# Patient Record
Sex: FEMALE | Race: WHITE | NOT HISPANIC OR LATINO | Employment: OTHER | URBAN - METROPOLITAN AREA
[De-identification: names, ages, dates, MRNs, and addresses within clinical notes are randomized per-mention and may not be internally consistent; named-entity substitution may affect disease eponyms.]

---

## 2017-07-06 ENCOUNTER — APPOINTMENT (OUTPATIENT)
Dept: WOMENS IMAGING | Facility: HOSPITAL | Age: 62
End: 2017-07-06

## 2017-07-06 PROCEDURE — 77067 SCR MAMMO BI INCL CAD: CPT | Performed by: RADIOLOGY

## 2017-07-06 PROCEDURE — 77063 BREAST TOMOSYNTHESIS BI: CPT | Performed by: RADIOLOGY

## 2017-10-27 ENCOUNTER — OFFICE VISIT (OUTPATIENT)
Dept: CARDIOLOGY | Facility: CLINIC | Age: 62
End: 2017-10-27

## 2017-10-27 VITALS
WEIGHT: 176 LBS | DIASTOLIC BLOOD PRESSURE: 86 MMHG | HEART RATE: 67 BPM | BODY MASS INDEX: 29.32 KG/M2 | HEIGHT: 65 IN | SYSTOLIC BLOOD PRESSURE: 122 MMHG

## 2017-10-27 DIAGNOSIS — E78.2 MIXED HYPERLIPIDEMIA: ICD-10-CM

## 2017-10-27 DIAGNOSIS — I47.1 SUPRAVENTRICULAR TACHYCARDIA (HCC): Primary | ICD-10-CM

## 2017-10-27 DIAGNOSIS — R00.2 PALPITATIONS: ICD-10-CM

## 2017-10-27 PROCEDURE — 93000 ELECTROCARDIOGRAM COMPLETE: CPT | Performed by: INTERNAL MEDICINE

## 2017-10-27 PROCEDURE — 99204 OFFICE O/P NEW MOD 45 MIN: CPT | Performed by: INTERNAL MEDICINE

## 2017-10-27 RX ORDER — CELECOXIB 200 MG/1
200 CAPSULE ORAL DAILY
COMMUNITY
Start: 2017-09-15 | End: 2021-10-04

## 2017-10-27 RX ORDER — CHOLECALCIFEROL (VITAMIN D3) 50 MCG
2000 TABLET ORAL DAILY
COMMUNITY

## 2017-10-27 RX ORDER — OMEPRAZOLE 40 MG/1
40 CAPSULE, DELAYED RELEASE ORAL DAILY
COMMUNITY
End: 2021-10-04 | Stop reason: SDUPTHER

## 2017-10-27 NOTE — PROGRESS NOTES
Date of Office Visit: 10/27/17  Encounter Provider: Jean Zazueta MD  Place of Service: [facility] CARDIOLOGY  Patient Name: Sweta Ceja  :1955      Chief Complaint   Patient presents with   • Irregular Heart Beat     History of Present Illness  HPI Comments: Mrs Ceja is a pleasant 61 yo female who has a history of hyperlipidemia, AV reentrant tachycardia, AV nelly modification, chest pain with normal cardiac catheterization the past.  She's had previous cardiac evaluation in the past with normal cardiac catheterization in , normal perfusion stress test in  normal echocardiogram in the past she was having episodes of palpitations rapid heart beating and in 2001 had EP study with ablation of slow AV nelly pathway.  She had been stable until about the last month she still these increased episodes of palpitations probably averaging maybe once a week he typically would last one to 2 minutes that showed an episode just the other day that was more severe and lasted about 10 minutes and on her that.  Her heart rate was 154 bpm.  She does get associated dizziness with that no real chest pain or pressure no near syncope or syncope.  She also had an episode about a week ago where she would get this abrupt onset of some dizziness and diaphoresis and that would come and go for a total of about 5 times.  He typically walks up to 3-5 miles a day without problems no chest pain or pressure.  She denies any recent change in medication.  Over the than this she overall feels like she's been doing well things at home are good.        Past Medical History:   Diagnosis Date   • Chest pain    • Chronic lung disease    • COPD (chronic obstructive pulmonary disease)    • GERD (gastroesophageal reflux disease)    • Hyperlipidemia    • Postmenopausal    • SVT (supraventricular tachycardia)          Past Surgical History:   Procedure Laterality Date   • ABLATION OF DYSRHYTHMIC FOCUS     •  CARDIAC CATHETERIZATION     •  SECTION     • CHOLECYSTECTOMY     • HYSTERECTOMY     • KNEE ARTHROSCOPY Bilateral     meniscus repair         Current Outpatient Prescriptions on File Prior to Visit   Medication Sig Dispense Refill   • calcium carbonate (OS-JOSIE) 600 MG tablet Take 600 mg by mouth Daily.     • [DISCONTINUED] aspirin 81 MG EC tablet Take 81 mg by mouth Daily.     • [DISCONTINUED] estrogens, conjugated, (PREMARIN) 0.3 MG tablet Take 0.3 mg by mouth Daily. Take daily for 21 days then do not take for 7 days.     • [DISCONTINUED] Omega-3 1000 MG capsule Take  by mouth.     • [DISCONTINUED] pantoprazole (PROTONIX) 40 MG EC tablet Take 40 mg by mouth Daily.     • [DISCONTINUED] simvastatin (ZOCOR) 20 MG tablet Take 20 mg by mouth Every Night.       No current facility-administered medications on file prior to visit.          Social History     Social History   • Marital status:      Spouse name: N/A   • Number of children: N/A   • Years of education: N/A     Occupational History   • Not on file.     Social History Main Topics   • Smoking status: Former Smoker     Packs/day: 0.75     Years: 15.00     Quit date: 10/27/1987   • Smokeless tobacco: Not on file   • Alcohol use Yes      Comment: socially,rare   • Drug use: Not on file   • Sexual activity: Not on file     Other Topics Concern   • Not on file     Social History Narrative       Family History   Problem Relation Age of Onset   • Atrial fibrillation Mother    • Cerebral aneurysm Mother    • Transient ischemic attack Mother    • Hypertension Mother    • Breast cancer Mother    • Diabetes type I Father          Review of Systems   Constitution: Negative for decreased appetite, diaphoresis, fever, weakness, malaise/fatigue, weight gain and weight loss.   HENT: Negative for congestion, hearing loss, nosebleeds and tinnitus.    Eyes: Negative for blurred vision, double vision, vision loss in left eye, vision loss in right eye and visual  "disturbance.   Cardiovascular:        As noted in HPI   Respiratory:        As noted HPI   Endocrine: Negative for cold intolerance and heat intolerance.   Hematologic/Lymphatic: Negative for bleeding problem. Does not bruise/bleed easily.   Skin: Negative for color change, flushing, itching and rash.   Musculoskeletal: Negative for arthritis, back pain, joint pain, joint swelling, muscle weakness and myalgias.   Gastrointestinal: Negative for bloating, abdominal pain, constipation, diarrhea, dysphagia, heartburn, hematemesis, hematochezia, melena, nausea and vomiting.   Genitourinary: Negative for bladder incontinence, dysuria, frequency, nocturia and urgency.   Neurological: Negative for dizziness, focal weakness, headaches, light-headedness, loss of balance, numbness, paresthesias and vertigo.   Psychiatric/Behavioral: Negative for depression, memory loss and substance abuse.       Procedures      ECG 12 Lead  Date/Time: 10/27/2017 11:58 AM  Performed by: ABHILASH PEDRAZA  Authorized by: ABHILASH PEDRAZA   Comparison: compared with previous ECG   Similar to previous ECG  Rhythm: sinus rhythm  Rate: normal  QRS axis: normal                 Objective:    /86 (BP Location: Left arm)  Pulse 67  Ht 65\" (165.1 cm)  Wt 176 lb (79.8 kg)  BMI 29.29 kg/m2       Physical Exam  Physical Exam   Constitutional: She is oriented to person, place, and time. She appears well-developed and well-nourished. No distress.   HENT:   Head: Normocephalic.   Eyes: Conjunctivae are normal. Pupils are equal, round, and reactive to light. No scleral icterus.   Neck: Normal carotid pulses, no hepatojugular reflux and no JVD present. Carotid bruit is not present. No tracheal deviation, no edema and no erythema present. No thyromegaly present.   Cardiovascular: Normal rate, regular rhythm, S1 normal, S2 normal, normal heart sounds and intact distal pulses.   No extrasystoles are present. PMI is not displaced.  Exam reveals no gallop, " no distant heart sounds and no friction rub.    No murmur heard.  Pulses:       Carotid pulses are 2+ on the right side, and 2+ on the left side.       Radial pulses are 2+ on the right side, and 2+ on the left side.        Femoral pulses are 2+ on the right side, and 2+ on the left side.       Dorsalis pedis pulses are 2+ on the right side, and 2+ on the left side.        Posterior tibial pulses are 2+ on the right side, and 2+ on the left side.   Pulmonary/Chest: Effort normal and breath sounds normal. No respiratory distress. She has no decreased breath sounds. She has no wheezes. She has no rhonchi. She has no rales. She exhibits no tenderness.   Abdominal: Soft. Bowel sounds are normal. She exhibits no distension and no mass. There is no hepatosplenomegaly. There is no tenderness. There is no rebound and no guarding.   Musculoskeletal: She exhibits no edema, tenderness or deformity.   Neurological: She is alert and oriented to person, place, and time.   Skin: Skin is warm and dry. No rash noted. She is not diaphoretic. No cyanosis or erythema. No pallor. Nails show no clubbing.   Psychiatric: She has a normal mood and affect. Her speech is normal and behavior is normal. Judgment and thought content normal.           Assessment:   1.  62-year-old female with history of AV node reentrant tachycardia status post AV nelly modification in November 2011 previous normal cardiac evaluation with normal cardiac catheterization and normal echocardiogram.  Now with what appears to be recurrent episodes of palpitations that could represent the same arrhythmia or different arrhythmia.  She has lab work pending from her PCP we'll try to get copies of that.  In addition we'll have her wear a 30 day event monitor she does get episodes daily and make further recommendations pending the result of that.  2.  History of AV nelly reentry tachycardia status post ablation as outlined above.  She is to go back on 81 mg coated aspirin  daily.  3.  History of hyperlipidemia on target dose simvastatin continue the same.  4.  Negative cardiovascular evaluation in the past with normal cardiac catheterization an echocardiogram in 2001.  Normal perfusion stress test 2008.          Plan:

## 2017-11-02 ENCOUNTER — TELEPHONE (OUTPATIENT)
Dept: CARDIOLOGY | Facility: CLINIC | Age: 62
End: 2017-11-02

## 2017-11-07 RX ORDER — DILTIAZEM HYDROCHLORIDE 180 MG/1
180 CAPSULE, EXTENDED RELEASE ORAL DAILY
Qty: 30 CAPSULE | Refills: 11 | Status: SHIPPED | OUTPATIENT
Start: 2017-11-07 | End: 2021-10-04

## 2017-11-07 NOTE — TELEPHONE ENCOUNTER
"Pt called back regarding the Metoprolol you started her on last week.  She states this medication makes her feel fatigued, depressed and not wanting to leave the house.  She also c/o being \"castañeda\"    Please advise/f    # [phone]  "

## 2017-11-28 ENCOUNTER — TELEPHONE (OUTPATIENT)
Dept: CARDIOLOGY | Facility: CLINIC | Age: 62
End: 2017-11-28

## 2017-11-28 LAB
Lab: 6
TOAL ENROLLMENT DAYS: 30

## 2017-11-28 NOTE — TELEPHONE ENCOUNTER
Call, Sweta  Female, 62 y.o., 1955  PCP:   Georges Brooke MD  Language:   English  Need Interp:   No  Last Weight:   176 lb (79.8 kg)  Phone:   M: [phone] H: [phone]  Allergies  Lodine [Etodolac]  Health Maintenance:   Due  FYI:   None  Primary Ins.:   DEE GAVIN  MRN:   [id number]  MyChart:   Pending  Pharmacy:   [facility] [phone] [facility] [phone] FX [[id number]]  Preferred Lab:   None  Next Appt with Me:   None  Next Appt Date by Dept:   None    Cardiac Event Monitor   Status:  Final result   Visible to patient:  No (Not Released) Dx:  Supraventricular tachycardia; Palpita... Order: [id number]         Details        Reading Physician Reading Date Result Priority       Jean Zazueta MD 11/28/2017 Routine           Result Text             · The patient wore a 30 day event monitor. A total of 6patient triggered events were sent.  · These episodes correlated to premature atrial beats including nonsustained atrial tachycardia the longest being 8 beats.                    All Measurements          8:11 AM         Total Enrollment Days 30       Number of Transmissions 6                       [facility] CARDIOLOGY  [address] [place] [address]                       Last Resulted: 11/28/17  8:51 AM                            Status of Other Orders        Order    Lab Status Result Date Provider Status       ECG 12 Lead Final result 10/27/2017 Open       SCANNED EKG Final result 10/27/2017 Open                  Routing History        Priority Sent On From To Message Type        11/28/2017  8:52 AM MD Jean Deleon MD Results

## 2018-07-19 ENCOUNTER — APPOINTMENT (OUTPATIENT)
Dept: WOMENS IMAGING | Facility: HOSPITAL | Age: 63
End: 2018-07-19

## 2018-07-19 PROCEDURE — 77067 SCR MAMMO BI INCL CAD: CPT | Performed by: RADIOLOGY

## 2018-07-19 PROCEDURE — 77063 BREAST TOMOSYNTHESIS BI: CPT | Performed by: RADIOLOGY

## 2021-03-19 ENCOUNTER — BULK ORDERING (OUTPATIENT)
Dept: CASE MANAGEMENT | Facility: OTHER | Age: 66
End: 2021-03-19

## 2021-03-19 DIAGNOSIS — Z23 IMMUNIZATION DUE: ICD-10-CM

## 2021-08-20 ENCOUNTER — APPOINTMENT (OUTPATIENT)
Dept: WOMENS IMAGING | Facility: HOSPITAL | Age: 66
End: 2021-08-20

## 2021-08-20 PROCEDURE — 77067 SCR MAMMO BI INCL CAD: CPT | Performed by: RADIOLOGY

## 2021-08-20 PROCEDURE — 77063 BREAST TOMOSYNTHESIS BI: CPT | Performed by: RADIOLOGY

## 2021-10-04 ENCOUNTER — OFFICE VISIT (OUTPATIENT)
Dept: FAMILY MEDICINE CLINIC | Facility: CLINIC | Age: 66
End: 2021-10-04

## 2021-10-04 VITALS
TEMPERATURE: 98.2 F | WEIGHT: 187 LBS | HEART RATE: 69 BPM | BODY MASS INDEX: 31.16 KG/M2 | HEIGHT: 65 IN | DIASTOLIC BLOOD PRESSURE: 72 MMHG | SYSTOLIC BLOOD PRESSURE: 140 MMHG | OXYGEN SATURATION: 99 %

## 2021-10-04 DIAGNOSIS — Z79.899 HIGH RISK MEDICATION USE: ICD-10-CM

## 2021-10-04 DIAGNOSIS — Z23 ENCOUNTER FOR IMMUNIZATION: ICD-10-CM

## 2021-10-04 DIAGNOSIS — E78.2 MIXED HYPERLIPIDEMIA: ICD-10-CM

## 2021-10-04 DIAGNOSIS — Z00.00 MEDICARE ANNUAL WELLNESS VISIT, SUBSEQUENT: Primary | ICD-10-CM

## 2021-10-04 DIAGNOSIS — K21.9 GASTRO-ESOPHAGEAL REFLUX DISEASE WITHOUT ESOPHAGITIS: ICD-10-CM

## 2021-10-04 DIAGNOSIS — R53.82 CHRONIC FATIGUE: ICD-10-CM

## 2021-10-04 DIAGNOSIS — E55.9 VITAMIN D DEFICIENCY: ICD-10-CM

## 2021-10-04 PROBLEM — G47.00 INSOMNIA: Status: ACTIVE | Noted: 2019-02-14

## 2021-10-04 PROCEDURE — 90662 IIV NO PRSV INCREASED AG IM: CPT | Performed by: FAMILY MEDICINE

## 2021-10-04 PROCEDURE — 99204 OFFICE O/P NEW MOD 45 MIN: CPT | Performed by: FAMILY MEDICINE

## 2021-10-04 PROCEDURE — 1170F FXNL STATUS ASSESSED: CPT | Performed by: FAMILY MEDICINE

## 2021-10-04 PROCEDURE — G0008 ADMIN INFLUENZA VIRUS VAC: HCPCS | Performed by: FAMILY MEDICINE

## 2021-10-04 PROCEDURE — G0439 PPPS, SUBSEQ VISIT: HCPCS | Performed by: FAMILY MEDICINE

## 2021-10-04 PROCEDURE — 1159F MED LIST DOCD IN RCRD: CPT | Performed by: FAMILY MEDICINE

## 2021-10-04 RX ORDER — MULTIPLE VITAMINS W/ MINERALS TAB 9MG-400MCG
1 TAB ORAL DAILY
COMMUNITY
End: 2022-03-14

## 2021-10-04 RX ORDER — OMEPRAZOLE 40 MG/1
40 CAPSULE, DELAYED RELEASE ORAL DAILY
Qty: 90 CAPSULE | Refills: 3 | Status: SHIPPED | OUTPATIENT
Start: 2021-10-04 | End: 2022-10-23

## 2021-10-04 RX ORDER — VITAMIN B COMPLEX
1 CAPSULE ORAL DAILY
COMMUNITY

## 2021-10-04 NOTE — PROGRESS NOTES
The ABCs of the Annual Wellness Visit  Subsequent Medicare Wellness Visit    Chief Complaint   Patient presents with   • Medicare Wellness-subsequent      Subjective    History of Present Illness:  Sweta Ceja is a 66 y.o. female who presents for a Subsequent Medicare Wellness Visit.  Here as NP.     F/u Roger.  Doing well with med.s.  C/o fatigue.  Going on months.  No bettter.  Started B complex but no change.    The following portions of the patient's history were reviewed and   updated as appropriate: allergies, current medications, past family history, past medical history, past social history, past surgical history and problem list.    Compared to one year ago, the patient feels her physical   health is the same.    Compared to one year ago, the patient feels her mental   health is the same.    Recent Hospitalizations:  She was not admitted to the hospital during the last year.       Current Medical Providers:  Patient Care Team:  Georges Brooke MD as PCP - General (Family Medicine)    Outpatient Medications Prior to Visit   Medication Sig Dispense Refill   • B Complex Vitamins (vitamin b complex) capsule capsule Take 1 capsule by mouth Daily.     • calcium carbonate (OS-JOSIE) 600 MG tablet Take 600 mg by mouth Daily.     • Cholecalciferol (VITAMIN D) 2000 units tablet Take 2,000 Units by mouth Daily.     • magnesium oxide (MAGOX) 400 (241.3 Mg) MG tablet tablet Take 400 mg by mouth Daily.     • multivitamin with minerals tablet tablet Take 1 tablet by mouth Daily.     • omeprazole (priLOSEC) 40 MG capsule Take 40 mg by mouth Daily.     • celecoxib (CeleBREX) 200 MG capsule Take 200 mg by mouth Daily.     • diltiazem XR (DILACOR XR) 180 MG 24 hr capsule Take 1 capsule by mouth Daily. 30 capsule 11     No facility-administered medications prior to visit.       No opioid medication identified on active medication list. I have reviewed chart for other potential  high risk medication/s and harmful drug  "interactions in the elderly.          Aspirin is not on active medication list.  Aspirin use is not indicated based on review of current medical condition/s. Risk of harm outweighs potential benefits.  .    Patient Active Problem List   Diagnosis   • Gastro-esophageal reflux disease without esophagitis   • Hyperlipidemia   • Insomnia   • Obesity   • Vitamin D deficiency   • Medicare annual wellness visit, subsequent   • Chronic fatigue     Advance Care Planning  Advance Directive is not on file.  ACP discussion was held with the patient during this visit. Patient has an advance directive (not in EMR), copy requested.    Review of Systems   Constitutional: Positive for fatigue. Negative for appetite change.   HENT: Negative for mouth sores and nosebleeds.    Eyes: Negative for photophobia.   Respiratory: Negative for cough and shortness of breath.    Cardiovascular: Negative for chest pain, palpitations and leg swelling.   Gastrointestinal: Negative for abdominal pain and anal bleeding.   Genitourinary: Negative for flank pain and frequency.   Musculoskeletal: Negative for back pain and gait problem.   Skin: Negative for color change and pallor.   Neurological: Negative for facial asymmetry.   Psychiatric/Behavioral: Negative for decreased concentration and dysphoric mood.        Objective    Vitals:    10/04/21 1036   BP: 140/72   BP Location: Right arm   Patient Position: Sitting   Pulse: 69   Temp: 98.2 °F (36.8 °C)   TempSrc: Temporal   SpO2: 99%   Weight: 84.8 kg (187 lb)   Height: 165.1 cm (65\")     BMI Readings from Last 1 Encounters:   10/04/21 31.12 kg/m²   BMI is above normal parameters. Recommendations include: exercise counseling    Does the patient have evidence of cognitive impairment? No    Physical Exam  Vitals reviewed.   Constitutional:       Appearance: She is well-developed. She is not diaphoretic.   HENT:      Head: Normocephalic and atraumatic.   Eyes:      General: No scleral icterus.     " Pupils: Pupils are equal, round, and reactive to light.   Neck:      Thyroid: No thyromegaly.   Cardiovascular:      Rate and Rhythm: Normal rate and regular rhythm.      Heart sounds: No murmur heard.   No friction rub. No gallop.    Pulmonary:      Effort: Pulmonary effort is normal. No respiratory distress.      Breath sounds: No wheezing or rales.   Chest:      Chest wall: No tenderness.   Abdominal:      General: Bowel sounds are normal. There is no distension.      Palpations: Abdomen is soft.      Tenderness: There is no abdominal tenderness.   Musculoskeletal:         General: No deformity. Normal range of motion.   Lymphadenopathy:      Cervical: No cervical adenopathy.   Skin:     General: Skin is warm and dry.      Findings: No rash.   Neurological:      Cranial Nerves: No cranial nerve deficit.      Motor: No abnormal muscle tone.                 HEALTH RISK ASSESSMENT    Smoking Status:  Social History     Tobacco Use   Smoking Status Former Smoker   • Packs/day: 0.75   • Years: 15.00   • Pack years: 11.25   • Quit date:    • Years since quittin.7   Smokeless Tobacco Never Used     Alcohol Consumption:  Social History     Substance and Sexual Activity   Alcohol Use Yes    Comment: socially,rare     Fall Risk Screen:    STEADI Fall Risk Assessment has not been completed.    Depression Screening:  No flowsheet data found.    Health Habits and Functional and Cognitive Screening:  Functional & Cognitive Status 10/4/2021   Do you have difficulty preparing food and eating? No   Do you have difficulty bathing yourself, getting dressed or grooming yourself? No   Do you have difficulty using the toilet? No   Do you have difficulty moving around from place to place? No   Do you have trouble with steps or getting out of a bed or a chair? No   Current Diet Limited Junk Food   Dental Exam Up to date   Eye Exam Up to date   Exercise (times per week) 5 times per week   Current Exercises Include Walking   Do  you need help using the phone?  No   Are you deaf or do you have serious difficulty hearing?  No   Do you need help with transportation? No   Do you need help shopping? No   Do you need help preparing meals?  No   Do you need help with housework?  No   Do you need help with laundry? No   Do you need help taking your medications? No   Do you need help managing money? No   Do you ever drive or ride in a car without wearing a seat belt? No   Have you felt unusual stress, anger or loneliness in the last month? No   Who do you live with? Spouse   If you need help, do you have trouble finding someone available to you? No   Do you have difficulty concentrating, remembering or making decisions? No       Age-appropriate Screening Schedule:  Refer to the list below for future screening recommendations based on patient's age, sex and/or medical conditions. Orders for these recommended tests are listed in the plan section. The patient has been provided with a written plan.    Health Maintenance   Topic Date Due   • DXA SCAN  Never done   • TDAP/TD VACCINES (2 - Tdap) 07/31/2010   • LIPID PANEL  Never done   • ZOSTER VACCINE (2 of 2) 08/23/2018   • INFLUENZA VACCINE  10/01/2021   • MAMMOGRAM  07/06/2023              Assessment/Plan   CMS Preventative Services Quick Reference  Risk Factors Identified During Encounter  Inactivity/Sedentary  The above risks/problems have been discussed with the patient.  Follow up actions/plans if indicated are seen below in the Assessment/Plan Section.  Pertinent information has been shared with the patient in the After Visit Summary.    Diagnoses and all orders for this visit:    1. Medicare annual wellness visit, subsequent (Primary)  -     Fluzone High-Dose 65+yrs    2. Chronic fatigue  -     CBC & Differential  -     Comprehensive Metabolic Panel  -     Vitamin B12  -     TSH Rfx On Abnormal To Free T4    3. Gastro-esophageal reflux disease without esophagitis  -     omeprazole (priLOSEC) 40  MG capsule; Take 1 capsule by mouth Daily.  Dispense: 90 capsule; Refill: 3    4. Mixed hyperlipidemia  -     Lipid Panel With / Chol / HDL Ratio    5. Vitamin D deficiency  -     Vitamin D 1,25 Dihydroxy    6. High risk medication use  -     Lipid Panel With / Chol / HDL Ratio  -     Vitamin B12    7. Encounter for immunization   -     Fluzone High-Dose 65+yrs      Fatigue.  New problem.  Check CBC, CMP, thyroid, b12.    Hyperlipidmeia.  Check FLP.    BRAULIO.  Controlled.  Continue PPI. RF omeprazole 40 today.    Preventive Counseling:  Encouraged to stay active.  Covid vaccine UTD.  Flu today.  Pneumovax UTD.  Colonoscopy held for covid reasons..    Dentist UTD.  Optho UTD.      Follow Up:   Return in about 4 months (around 2/4/2022).     An After Visit Summary and PPPS were made available to the patient.

## 2021-10-05 LAB
1,25(OH)2D SERPL-MCNC: 53.9 PG/ML (ref 19.9–79.3)
ALBUMIN SERPL-MCNC: 4.8 G/DL (ref 3.5–5.2)
ALBUMIN/GLOB SERPL: 2.5 G/DL
ALP SERPL-CCNC: 53 U/L (ref 39–117)
ALT SERPL-CCNC: 18 U/L (ref 1–33)
AST SERPL-CCNC: 18 U/L (ref 1–32)
BASOPHILS # BLD AUTO: 0.06 10*3/MM3 (ref 0–0.2)
BASOPHILS NFR BLD AUTO: 1 % (ref 0–1.5)
BILIRUB SERPL-MCNC: 0.4 MG/DL (ref 0–1.2)
BUN SERPL-MCNC: 14 MG/DL (ref 8–23)
BUN/CREAT SERPL: 18.9 (ref 7–25)
CALCIUM SERPL-MCNC: 9.5 MG/DL (ref 8.6–10.5)
CHLORIDE SERPL-SCNC: 102 MMOL/L (ref 98–107)
CHOLEST SERPL-MCNC: 243 MG/DL (ref 0–200)
CHOLEST/HDLC SERPL: 5.4 {RATIO}
CO2 SERPL-SCNC: 27 MMOL/L (ref 22–29)
CREAT SERPL-MCNC: 0.74 MG/DL (ref 0.57–1)
EOSINOPHIL # BLD AUTO: 0.12 10*3/MM3 (ref 0–0.4)
EOSINOPHIL NFR BLD AUTO: 2 % (ref 0.3–6.2)
ERYTHROCYTE [DISTWIDTH] IN BLOOD BY AUTOMATED COUNT: 13.2 % (ref 12.3–15.4)
GLOBULIN SER CALC-MCNC: 1.9 GM/DL
GLUCOSE SERPL-MCNC: 91 MG/DL (ref 65–99)
HCT VFR BLD AUTO: 41.8 % (ref 34–46.6)
HDLC SERPL-MCNC: 45 MG/DL (ref 40–60)
HGB BLD-MCNC: 13.9 G/DL (ref 12–15.9)
IMM GRANULOCYTES # BLD AUTO: 0.02 10*3/MM3 (ref 0–0.05)
IMM GRANULOCYTES NFR BLD AUTO: 0.3 % (ref 0–0.5)
LDLC SERPL CALC-MCNC: 160 MG/DL (ref 0–100)
LYMPHOCYTES # BLD AUTO: 1.87 10*3/MM3 (ref 0.7–3.1)
LYMPHOCYTES NFR BLD AUTO: 30.8 % (ref 19.6–45.3)
MCH RBC QN AUTO: 29 PG (ref 26.6–33)
MCHC RBC AUTO-ENTMCNC: 33.3 G/DL (ref 31.5–35.7)
MCV RBC AUTO: 87.3 FL (ref 79–97)
MONOCYTES # BLD AUTO: 0.46 10*3/MM3 (ref 0.1–0.9)
MONOCYTES NFR BLD AUTO: 7.6 % (ref 5–12)
NEUTROPHILS # BLD AUTO: 3.54 10*3/MM3 (ref 1.7–7)
NEUTROPHILS NFR BLD AUTO: 58.3 % (ref 42.7–76)
NRBC BLD AUTO-RTO: 0 /100 WBC (ref 0–0.2)
PLATELET # BLD AUTO: 295 10*3/MM3 (ref 140–450)
POTASSIUM SERPL-SCNC: 4.5 MMOL/L (ref 3.5–5.2)
PROT SERPL-MCNC: 6.7 G/DL (ref 6–8.5)
RBC # BLD AUTO: 4.79 10*6/MM3 (ref 3.77–5.28)
SODIUM SERPL-SCNC: 143 MMOL/L (ref 136–145)
TRIGL SERPL-MCNC: 208 MG/DL (ref 0–150)
TSH SERPL DL<=0.005 MIU/L-ACNC: 0.78 UIU/ML (ref 0.27–4.2)
VIT B12 SERPL-MCNC: 1323 PG/ML (ref 211–946)
VLDLC SERPL CALC-MCNC: 38 MG/DL (ref 5–40)
WBC # BLD AUTO: 6.07 10*3/MM3 (ref 3.4–10.8)

## 2022-03-14 ENCOUNTER — OFFICE VISIT (OUTPATIENT)
Dept: FAMILY MEDICINE CLINIC | Facility: CLINIC | Age: 67
End: 2022-03-14

## 2022-03-14 VITALS
TEMPERATURE: 98.7 F | HEART RATE: 77 BPM | WEIGHT: 178 LBS | SYSTOLIC BLOOD PRESSURE: 112 MMHG | HEIGHT: 65 IN | DIASTOLIC BLOOD PRESSURE: 66 MMHG | OXYGEN SATURATION: 98 % | BODY MASS INDEX: 29.66 KG/M2

## 2022-03-14 DIAGNOSIS — T30.0 BURN: ICD-10-CM

## 2022-03-14 DIAGNOSIS — R05.9 COUGH: Primary | ICD-10-CM

## 2022-03-14 PROCEDURE — 99214 OFFICE O/P EST MOD 30 MIN: CPT | Performed by: FAMILY MEDICINE

## 2022-03-14 RX ORDER — CLINDAMYCIN HYDROCHLORIDE 300 MG/1
300 CAPSULE ORAL 3 TIMES DAILY
Qty: 30 CAPSULE | Refills: 0 | Status: SHIPPED | OUTPATIENT
Start: 2022-03-14 | End: 2022-11-23

## 2022-03-14 NOTE — PROGRESS NOTES
Chief Complaint   Patient presents with   • Cough     10 days, causing headache   • Headache       Subjective   Sweta Ceja is a 67 y.o. female.     History of Present Illness   C/o 10 days ago with lower abdominal pain and headache.  Had diarrhea with it.  No fever.   Then developed a cough 9 days ago.  Started a day after had vomiting.  Some mnucus production as well.    C/o burn on L hand.  There for a few days.    The following portions of the patient's history were reviewed and updated as appropriate: allergies, current medications, past family history, past medical history, past social history, past surgical history and problem list.    Review of Systems   Constitutional: Negative for chills, fever and unexpected weight loss.   HENT: Positive for sore throat. Negative for ear pain, postnasal drip and rhinorrhea.    Respiratory: Positive for cough. Negative for shortness of breath and wheezing.    Cardiovascular: Negative for chest pain.   Musculoskeletal: Negative for myalgias.   Skin: Negative for rash.       Patient Active Problem List   Diagnosis   • Gastro-esophageal reflux disease without esophagitis   • Hyperlipidemia   • Insomnia   • Obesity   • Vitamin D deficiency   • Medicare annual wellness visit, subsequent   • Chronic fatigue   • Cough   • Burn       Allergies   Allergen Reactions   • Lodine [Etodolac]          Current Outpatient Medications:   •  B Complex Vitamins (vitamin b complex) capsule capsule, Take 1 capsule by mouth Daily., Disp: , Rfl:   •  calcium carbonate (OS-JOSIE) 600 MG tablet, Take 600 mg by mouth Daily., Disp: , Rfl:   •  Cholecalciferol (VITAMIN D) 2000 units tablet, Take 2,000 Units by mouth Daily., Disp: , Rfl:   •  magnesium oxide (MAGOX) 400 (241.3 Mg) MG tablet tablet, Take 400 mg by mouth Daily., Disp: , Rfl:   •  omeprazole (priLOSEC) 40 MG capsule, Take 1 capsule by mouth Daily., Disp: 90 capsule, Rfl: 3    Past Medical History:   Diagnosis Date   • Chest pain    • Chronic  lung disease    • COPD (chronic obstructive pulmonary disease) (HCC)    • GERD (gastroesophageal reflux disease)    • Hyperlipidemia    • Postmenopausal    • SVT (supraventricular tachycardia) (HCC)        Past Surgical History:   Procedure Laterality Date   • ABLATION OF DYSRHYTHMIC FOCUS     • CARDIAC CATHETERIZATION     •  SECTION     • CHOLECYSTECTOMY     • HYSTERECTOMY     • KNEE ARTHROSCOPY Bilateral     meniscus repair       Family History   Problem Relation Age of Onset   • Atrial fibrillation Mother    • Cerebral aneurysm Mother    • Transient ischemic attack Mother    • Hypertension Mother    • Breast cancer Mother    • Arthritis Mother    • Diabetes type I Father    • Kidney disease Father        Social History     Tobacco Use   • Smoking status: Former Smoker     Packs/day: 0.50     Years: 15.00     Pack years: 7.50     Types: Cigarettes     Start date: 1971     Quit date: 2001     Years since quittin.2   • Smokeless tobacco: Never Used   Substance Use Topics   • Alcohol use: Not Currently     Comment: socially,rare            Objective     Vitals:    22 1511   BP: 112/66   Pulse: 77   Temp: 98.7 °F (37.1 °C)   SpO2: 98%     Body mass index is 29.62 kg/m².    Physical Exam  Vitals reviewed.   Constitutional:       Appearance: She is well-developed. She is not diaphoretic.   HENT:      Head: Normocephalic and atraumatic.   Eyes:      General: No scleral icterus.     Pupils: Pupils are equal, round, and reactive to light.   Neck:      Thyroid: No thyromegaly.   Cardiovascular:      Rate and Rhythm: Normal rate and regular rhythm.      Heart sounds: No murmur heard.    No friction rub. No gallop.   Pulmonary:      Effort: Pulmonary effort is normal. No respiratory distress.      Breath sounds: No wheezing or rales.   Chest:      Chest wall: No tenderness.   Abdominal:      General: Bowel sounds are normal. There is no distension.      Palpations: Abdomen is soft.      Tenderness:  There is no abdominal tenderness.   Musculoskeletal:         General: No deformity. Normal range of motion.   Lymphadenopathy:      Cervical: No cervical adenopathy.   Skin:     General: Skin is warm and dry.      Findings: No rash.      Comments: Lthumb with linear burn 2nd degree.   Neurological:      Cranial Nerves: No cranial nerve deficit.      Motor: No abnormal muscle tone.         Lab Results   Component Value Date    GLUCOSE 91 10/04/2021    BUN 14 10/04/2021    CREATININE 0.74 10/04/2021    EGFRIFNONA 78 10/04/2021    EGFRIFAFRI 95 10/04/2021    BCR 18.9 10/04/2021    K 4.5 10/04/2021    CO2 27.0 10/04/2021    CALCIUM 9.5 10/04/2021    PROTENTOTREF 6.7 10/04/2021    ALBUMIN 4.80 10/04/2021    LABIL2 2.5 10/04/2021    AST 18 10/04/2021    ALT 18 10/04/2021       WBC   Date Value Ref Range Status   10/04/2021 6.07 3.40 - 10.80 10*3/mm3 Final     RBC   Date Value Ref Range Status   10/04/2021 4.79 3.77 - 5.28 10*6/mm3 Final     Hemoglobin   Date Value Ref Range Status   10/04/2021 13.9 12.0 - 15.9 g/dL Final     Hematocrit   Date Value Ref Range Status   10/04/2021 41.8 34.0 - 46.6 % Final     MCV   Date Value Ref Range Status   10/04/2021 87.3 79.0 - 97.0 fL Final     MCH   Date Value Ref Range Status   10/04/2021 29.0 26.6 - 33.0 pg Final     MCHC   Date Value Ref Range Status   10/04/2021 33.3 31.5 - 35.7 g/dL Final     RDW   Date Value Ref Range Status   10/04/2021 13.2 12.3 - 15.4 % Final     Platelets   Date Value Ref Range Status   10/04/2021 295 140 - 450 10*3/mm3 Final     Neutrophil Rel %   Date Value Ref Range Status   10/04/2021 58.3 42.7 - 76.0 % Final     Lymphocyte Rel %   Date Value Ref Range Status   10/04/2021 30.8 19.6 - 45.3 % Final     Monocyte Rel %   Date Value Ref Range Status   10/04/2021 7.6 5.0 - 12.0 % Final     Eosinophil Rel %   Date Value Ref Range Status   10/04/2021 2.0 0.3 - 6.2 % Final     Basophil Rel %   Date Value Ref Range Status   10/04/2021 1.0 0.0 - 1.5 % Final      Neutrophils Absolute   Date Value Ref Range Status   10/04/2021 3.54 1.70 - 7.00 10*3/mm3 Final     Lymphocytes Absolute   Date Value Ref Range Status   10/04/2021 1.87 0.70 - 3.10 10*3/mm3 Final     Monocytes Absolute   Date Value Ref Range Status   10/04/2021 0.46 0.10 - 0.90 10*3/mm3 Final     Eosinophils Absolute   Date Value Ref Range Status   10/04/2021 0.12 0.00 - 0.40 10*3/mm3 Final     Basophils Absolute   Date Value Ref Range Status   10/04/2021 0.06 0.00 - 0.20 10*3/mm3 Final     nRBC   Date Value Ref Range Status   10/04/2021 0.0 0.0 - 0.2 /100 WBC Final       No results found for: HGBA1C    Lab Results   Component Value Date    MTETXAOV05 1,323 (H) 10/04/2021       TSH   Date Value Ref Range Status   10/04/2021 0.776 0.270 - 4.200 uIU/mL Final       No results found for: CHOL  Lab Results   Component Value Date    TRIG 208 (H) 10/04/2021     Lab Results   Component Value Date    HDL 45 10/04/2021     Lab Results   Component Value Date     (H) 10/04/2021     Lab Results   Component Value Date    VLDL 38 10/04/2021     No results found for: LDLHDL      Procedures    Assessment/Plan   Problems Addressed this Visit     Burn    Cough - Primary      Diagnoses       Codes Comments    Cough    -  Primary ICD-10-CM: R05.9  ICD-9-CM: 786.2     Burn     ICD-10-CM: T30.0  ICD-9-CM: 949.0       Cough with systemic symptoms.  Likely aspiration with chemical bronchitis vs anaerobic infection.  Tx with clindamycin.    Burn.  Silvadene rx.      No orders of the defined types were placed in this encounter.      Current Outpatient Medications   Medication Sig Dispense Refill   • B Complex Vitamins (vitamin b complex) capsule capsule Take 1 capsule by mouth Daily.     • calcium carbonate (OS-JOSIE) 600 MG tablet Take 600 mg by mouth Daily.     • Cholecalciferol (VITAMIN D) 2000 units tablet Take 2,000 Units by mouth Daily.     • magnesium oxide (MAGOX) 400 (241.3 Mg) MG tablet tablet Take 400 mg by mouth Daily.      • omeprazole (priLOSEC) 40 MG capsule Take 1 capsule by mouth Daily. 90 capsule 3     No current facility-administered medications for this visit.       Sweta Ceja had no medications administered during this visit.    No follow-ups on file.    There are no Patient Instructions on file for this visit.

## 2022-05-23 ENCOUNTER — OFFICE VISIT (OUTPATIENT)
Dept: FAMILY MEDICINE CLINIC | Facility: CLINIC | Age: 67
End: 2022-05-23

## 2022-05-23 VITALS
DIASTOLIC BLOOD PRESSURE: 62 MMHG | SYSTOLIC BLOOD PRESSURE: 120 MMHG | HEART RATE: 64 BPM | TEMPERATURE: 98 F | BODY MASS INDEX: 29.66 KG/M2 | HEIGHT: 65 IN | WEIGHT: 178 LBS | OXYGEN SATURATION: 99 %

## 2022-05-23 DIAGNOSIS — K21.9 GASTRO-ESOPHAGEAL REFLUX DISEASE WITHOUT ESOPHAGITIS: Primary | ICD-10-CM

## 2022-05-23 DIAGNOSIS — E78.2 MIXED HYPERLIPIDEMIA: ICD-10-CM

## 2022-05-23 DIAGNOSIS — Z28.39 IMMUNIZATION DEFICIENCY: ICD-10-CM

## 2022-05-23 PROCEDURE — 0051A COVID-19 (PFIZER) 12+ YRS: CPT | Performed by: FAMILY MEDICINE

## 2022-05-23 PROCEDURE — 91305 COVID-19 (PFIZER) 12+ YRS: CPT | Performed by: FAMILY MEDICINE

## 2022-05-23 PROCEDURE — 99213 OFFICE O/P EST LOW 20 MIN: CPT | Performed by: FAMILY MEDICINE

## 2022-05-23 NOTE — PROGRESS NOTES
F/U BRAULIO.    Subjective   Sweta Ceja is a 67 y.o. female.     History of Present Illness   F/U Braulio.  Doing well with meds.   F/U hyperlipidmeia.  No meds.   The following portions of the patient's history were reviewed and updated as appropriate: allergies, current medications, past family history, past medical history, past social history, past surgical history and problem list.    Review of Systems   Constitutional: Negative for diaphoresis and fever.   HENT: Negative for postnasal drip and rhinorrhea.    Respiratory: Negative for shortness of breath and stridor.    Cardiovascular: Negative for chest pain and leg swelling.       Patient Active Problem List   Diagnosis   • Gastro-esophageal reflux disease without esophagitis   • Hyperlipidemia   • Insomnia   • Obesity   • Vitamin D deficiency   • Medicare annual wellness visit, subsequent   • Chronic fatigue   • Cough   • Burn   • Immunization deficiency       Allergies   Allergen Reactions   • Lodine [Etodolac]          Current Outpatient Medications:   •  B Complex Vitamins (vitamin b complex) capsule capsule, Take 1 capsule by mouth Daily., Disp: , Rfl:   •  calcium carbonate (OS-JOSIE) 600 MG tablet, Take 600 mg by mouth Daily., Disp: , Rfl:   •  Cholecalciferol (VITAMIN D) 2000 units tablet, Take 2,000 Units by mouth Daily., Disp: , Rfl:   •  clindamycin (CLEOCIN) 300 MG capsule, Take 1 capsule by mouth 3 (Three) Times a Day., Disp: 30 capsule, Rfl: 0  •  magnesium oxide (MAGOX) 400 (241.3 Mg) MG tablet tablet, Take 400 mg by mouth Daily., Disp: , Rfl:   •  omeprazole (priLOSEC) 40 MG capsule, Take 1 capsule by mouth Daily., Disp: 90 capsule, Rfl: 3  •  silver sulfadiazine (Silvadene) 1 % cream, Apply 1 application topically to the appropriate area as directed 2 (Two) Times a Day., Disp: 50 g, Rfl: 5    Past Medical History:   Diagnosis Date   • Chest pain    • Chronic lung disease    • COPD (chronic obstructive pulmonary disease) (HCC)    • GERD (gastroesophageal  reflux disease)    • Hyperlipidemia    • Postmenopausal    • SVT (supraventricular tachycardia) (HCC)        Past Surgical History:   Procedure Laterality Date   • ABLATION OF DYSRHYTHMIC FOCUS     • CARDIAC CATHETERIZATION     •  SECTION     • CHOLECYSTECTOMY     • HYSTERECTOMY     • KNEE ARTHROSCOPY Bilateral     meniscus repair       Family History   Problem Relation Age of Onset   • Atrial fibrillation Mother    • Cerebral aneurysm Mother    • Transient ischemic attack Mother    • Hypertension Mother    • Breast cancer Mother    • Arthritis Mother    • Diabetes type I Father    • Kidney disease Father        Social History     Tobacco Use   • Smoking status: Former Smoker     Packs/day: 0.50     Years: 15.00     Pack years: 7.50     Types: Cigarettes     Start date: 1971     Quit date: 2001     Years since quittin.4   • Smokeless tobacco: Never Used   Substance Use Topics   • Alcohol use: Not Currently     Comment: socially,rare            Objective     Vitals:    22 1410   BP: 120/62   Pulse: 64   Temp: 98 °F (36.7 °C)   SpO2: 99%     Body mass index is 29.62 kg/m².    Physical Exam  Vitals reviewed.   Constitutional:       Appearance: She is well-developed. She is not diaphoretic.   HENT:      Head: Normocephalic and atraumatic.   Eyes:      General: No scleral icterus.     Pupils: Pupils are equal, round, and reactive to light.   Neck:      Thyroid: No thyromegaly.   Cardiovascular:      Rate and Rhythm: Normal rate and regular rhythm.      Heart sounds: No murmur heard.    No friction rub. No gallop.   Pulmonary:      Effort: Pulmonary effort is normal. No respiratory distress.      Breath sounds: No wheezing or rales.   Chest:      Chest wall: No tenderness.   Abdominal:      General: Bowel sounds are normal. There is no distension.      Palpations: Abdomen is soft.      Tenderness: There is no abdominal tenderness.   Musculoskeletal:         General: No deformity. Normal range  of motion.   Lymphadenopathy:      Cervical: No cervical adenopathy.   Skin:     General: Skin is warm and dry.      Findings: No rash.   Neurological:      Cranial Nerves: No cranial nerve deficit.      Motor: No abnormal muscle tone.         Lab Results   Component Value Date    GLUCOSE 91 10/04/2021    BUN 14 10/04/2021    CREATININE 0.74 10/04/2021    EGFRIFNONA 78 10/04/2021    EGFRIFAFRI 95 10/04/2021    BCR 18.9 10/04/2021    K 4.5 10/04/2021    CO2 27.0 10/04/2021    CALCIUM 9.5 10/04/2021    PROTENTOTREF 6.7 10/04/2021    ALBUMIN 4.80 10/04/2021    LABIL2 2.5 10/04/2021    AST 18 10/04/2021    ALT 18 10/04/2021       WBC   Date Value Ref Range Status   10/04/2021 6.07 3.40 - 10.80 10*3/mm3 Final     RBC   Date Value Ref Range Status   10/04/2021 4.79 3.77 - 5.28 10*6/mm3 Final     Hemoglobin   Date Value Ref Range Status   10/04/2021 13.9 12.0 - 15.9 g/dL Final     Hematocrit   Date Value Ref Range Status   10/04/2021 41.8 34.0 - 46.6 % Final     MCV   Date Value Ref Range Status   10/04/2021 87.3 79.0 - 97.0 fL Final     MCH   Date Value Ref Range Status   10/04/2021 29.0 26.6 - 33.0 pg Final     MCHC   Date Value Ref Range Status   10/04/2021 33.3 31.5 - 35.7 g/dL Final     RDW   Date Value Ref Range Status   10/04/2021 13.2 12.3 - 15.4 % Final     Platelets   Date Value Ref Range Status   10/04/2021 295 140 - 450 10*3/mm3 Final     Neutrophil Rel %   Date Value Ref Range Status   10/04/2021 58.3 42.7 - 76.0 % Final     Lymphocyte Rel %   Date Value Ref Range Status   10/04/2021 30.8 19.6 - 45.3 % Final     Monocyte Rel %   Date Value Ref Range Status   10/04/2021 7.6 5.0 - 12.0 % Final     Eosinophil Rel %   Date Value Ref Range Status   10/04/2021 2.0 0.3 - 6.2 % Final     Basophil Rel %   Date Value Ref Range Status   10/04/2021 1.0 0.0 - 1.5 % Final     Neutrophils Absolute   Date Value Ref Range Status   10/04/2021 3.54 1.70 - 7.00 10*3/mm3 Final     Lymphocytes Absolute   Date Value Ref Range Status    10/04/2021 1.87 0.70 - 3.10 10*3/mm3 Final     Monocytes Absolute   Date Value Ref Range Status   10/04/2021 0.46 0.10 - 0.90 10*3/mm3 Final     Eosinophils Absolute   Date Value Ref Range Status   10/04/2021 0.12 0.00 - 0.40 10*3/mm3 Final     Basophils Absolute   Date Value Ref Range Status   10/04/2021 0.06 0.00 - 0.20 10*3/mm3 Final     nRBC   Date Value Ref Range Status   10/04/2021 0.0 0.0 - 0.2 /100 WBC Final       No results found for: HGBA1C    Lab Results   Component Value Date    QREIZBYB47 1,323 (H) 10/04/2021       TSH   Date Value Ref Range Status   10/04/2021 0.776 0.270 - 4.200 uIU/mL Final       No results found for: CHOL  Lab Results   Component Value Date    TRIG 208 (H) 10/04/2021     Lab Results   Component Value Date    HDL 45 10/04/2021     Lab Results   Component Value Date     (H) 10/04/2021     Lab Results   Component Value Date    VLDL 38 10/04/2021     No results found for: LDLHDL      Procedures    Assessment & Plan   Problems Addressed this Visit     Gastro-esophageal reflux disease without esophagitis - Primary    Hyperlipidemia    Immunization deficiency      Diagnoses       Codes Comments    Gastro-esophageal reflux disease without esophagitis    -  Primary ICD-10-CM: K21.9  ICD-9-CM: 530.81     Mixed hyperlipidemia     ICD-10-CM: E78.2  ICD-9-CM: 272.2     Immunization deficiency     ICD-10-CM: Z28.39  ICD-9-CM: V15.83         BRAULIO.   Controlled.  Continue PPI.   Hyperlipidmeia.  Continue TLC.     Orders Placed This Encounter   Procedures   • COVID-19 Vaccine (Pfizer) Gray Cap       Current Outpatient Medications   Medication Sig Dispense Refill   • B Complex Vitamins (vitamin b complex) capsule capsule Take 1 capsule by mouth Daily.     • calcium carbonate (OS-JOSIE) 600 MG tablet Take 600 mg by mouth Daily.     • Cholecalciferol (VITAMIN D) 2000 units tablet Take 2,000 Units by mouth Daily.     • clindamycin (CLEOCIN) 300 MG capsule Take 1 capsule by mouth 3 (Three) Times a  Day. 30 capsule 0   • magnesium oxide (MAGOX) 400 (241.3 Mg) MG tablet tablet Take 400 mg by mouth Daily.     • omeprazole (priLOSEC) 40 MG capsule Take 1 capsule by mouth Daily. 90 capsule 3   • silver sulfadiazine (Silvadene) 1 % cream Apply 1 application topically to the appropriate area as directed 2 (Two) Times a Day. 50 g 5     No current facility-administered medications for this visit.       Sweta Ceja had no medications administered during this visit.    Return in about 5 months (around 10/23/2022) for Medicare wellness and OV, 30 minutes.    There are no Patient Instructions on file for this visit.

## 2022-08-26 ENCOUNTER — APPOINTMENT (OUTPATIENT)
Dept: WOMENS IMAGING | Facility: HOSPITAL | Age: 67
End: 2022-08-26

## 2022-08-26 PROCEDURE — 77063 BREAST TOMOSYNTHESIS BI: CPT | Performed by: RADIOLOGY

## 2022-08-26 PROCEDURE — 77067 SCR MAMMO BI INCL CAD: CPT | Performed by: RADIOLOGY

## 2022-10-22 DIAGNOSIS — K21.9 GASTRO-ESOPHAGEAL REFLUX DISEASE WITHOUT ESOPHAGITIS: ICD-10-CM

## 2022-10-23 RX ORDER — OMEPRAZOLE 40 MG/1
CAPSULE, DELAYED RELEASE ORAL
Qty: 90 CAPSULE | Refills: 3 | Status: SHIPPED | OUTPATIENT
Start: 2022-10-23

## 2022-11-23 ENCOUNTER — OFFICE VISIT (OUTPATIENT)
Dept: FAMILY MEDICINE CLINIC | Facility: CLINIC | Age: 67
End: 2022-11-23

## 2022-11-23 VITALS
HEIGHT: 65 IN | HEART RATE: 81 BPM | TEMPERATURE: 98.6 F | SYSTOLIC BLOOD PRESSURE: 128 MMHG | OXYGEN SATURATION: 96 % | DIASTOLIC BLOOD PRESSURE: 81 MMHG | BODY MASS INDEX: 30.59 KG/M2 | WEIGHT: 183.6 LBS

## 2022-11-23 DIAGNOSIS — Z11.59 ENCOUNTER FOR HEPATITIS C SCREENING TEST FOR LOW RISK PATIENT: ICD-10-CM

## 2022-11-23 DIAGNOSIS — E78.2 MIXED HYPERLIPIDEMIA: ICD-10-CM

## 2022-11-23 DIAGNOSIS — Z28.39 IMMUNIZATION DEFICIENCY: ICD-10-CM

## 2022-11-23 DIAGNOSIS — R53.82 CHRONIC FATIGUE: ICD-10-CM

## 2022-11-23 DIAGNOSIS — Z12.11 COLON CANCER SCREENING: ICD-10-CM

## 2022-11-23 DIAGNOSIS — Z00.00 MEDICARE ANNUAL WELLNESS VISIT, SUBSEQUENT: Primary | ICD-10-CM

## 2022-11-23 PROCEDURE — 0124A PR ADM SARSCOV2 30MCG/0.3ML BST: CPT | Performed by: FAMILY MEDICINE

## 2022-11-23 PROCEDURE — 91312 COVID-19 (PFIZER) BIVALENT BOOSTER 12+YRS: CPT | Performed by: FAMILY MEDICINE

## 2022-11-23 PROCEDURE — 1170F FXNL STATUS ASSESSED: CPT | Performed by: FAMILY MEDICINE

## 2022-11-23 PROCEDURE — G0439 PPPS, SUBSEQ VISIT: HCPCS | Performed by: FAMILY MEDICINE

## 2022-11-23 PROCEDURE — 1159F MED LIST DOCD IN RCRD: CPT | Performed by: FAMILY MEDICINE

## 2022-11-23 NOTE — PROGRESS NOTES
The ABCs of the Annual Wellness Visit  Subsequent Medicare Wellness Visit    Chief Complaint   Patient presents with   • Medicare Wellness-subsequent      Subjective    History of Present Illness:  Sweta Ceja is a 67 y.o. female who presents for a Subsequent Medicare Wellness Visit.    The following portions of the patient's history were reviewed and   updated as appropriate: allergies, current medications, past family history, past medical history, past social history, past surgical history and problem list.    Compared to one year ago, the patient feels her physical   health is the same.    Compared to one year ago, the patient feels her mental   health is the same.    Recent Hospitalizations:  She was not admitted to the hospital during the last year.       Current Medical Providers:  Patient Care Team:  Georges Brooke MD as PCP - General (Family Medicine)    Outpatient Medications Prior to Visit   Medication Sig Dispense Refill   • B Complex Vitamins (vitamin b complex) capsule capsule Take 1 capsule by mouth Daily.     • calcium carbonate (OS-JOSIE) 600 MG tablet Take 600 mg by mouth Daily.     • Cholecalciferol (VITAMIN D) 2000 units tablet Take 2,000 Units by mouth Daily.     • magnesium oxide (MAGOX) 400 (241.3 Mg) MG tablet tablet Take 400 mg by mouth Daily.     • omeprazole (priLOSEC) 40 MG capsule TAKE 1 CAPSULE DAILY 90 capsule 3   • silver sulfadiazine (Silvadene) 1 % cream Apply 1 application topically to the appropriate area as directed 2 (Two) Times a Day. 50 g 5   • clindamycin (CLEOCIN) 300 MG capsule Take 1 capsule by mouth 3 (Three) Times a Day. 30 capsule 0     No facility-administered medications prior to visit.       No opioid medication identified on active medication list. I have reviewed chart for other potential  high risk medication/s and harmful drug interactions in the elderly.          Aspirin is not on active medication list.  Aspirin use is not indicated based on review of current  "medical condition/s. Risk of harm outweighs potential benefits.  .    Patient Active Problem List   Diagnosis   • Gastro-esophageal reflux disease without esophagitis   • Hyperlipidemia   • Insomnia   • Obesity   • Vitamin D deficiency   • Medicare annual wellness visit, subsequent   • Chronic fatigue   • Cough   • Burn   • Immunization deficiency   • Colon cancer screening   • Encounter for hepatitis C screening test for low risk patient     Advance Care Planning  Advance Directive is not on file.  ACP discussion was held with the patient during this visit. Patient has an advance directive (not in EMR), copy requested.    Review of Systems   Constitutional: Negative for diaphoresis and fatigue.   HENT: Negative for postnasal drip and rhinorrhea.    Respiratory: Negative for cough and shortness of breath.    Cardiovascular: Negative for chest pain and leg swelling.        Objective    Vitals:    11/23/22 1335   BP: 128/81   BP Location: Left arm   Patient Position: Sitting   Cuff Size: Adult   Pulse: 81   Temp: 98.6 °F (37 °C)   SpO2: 96%   Weight: 83.3 kg (183 lb 9.6 oz)   Height: 165.1 cm (65\")     Estimated body mass index is 30.55 kg/m² as calculated from the following:    Height as of this encounter: 165.1 cm (65\").    Weight as of this encounter: 83.3 kg (183 lb 9.6 oz).    BMI is >= 30 and <35. (Class 1 Obesity). The following options were offered after discussion;: exercise counseling/recommendations      Does the patient have evidence of cognitive impairment? No    Physical Exam  Vitals reviewed.   Constitutional:       Appearance: She is well-developed. She is not diaphoretic.   HENT:      Head: Normocephalic and atraumatic.   Eyes:      General: No scleral icterus.     Pupils: Pupils are equal, round, and reactive to light.   Neck:      Thyroid: No thyromegaly.   Cardiovascular:      Rate and Rhythm: Normal rate and regular rhythm.      Heart sounds: No murmur heard.    No friction rub. No gallop. "   Pulmonary:      Effort: Pulmonary effort is normal. No respiratory distress.      Breath sounds: No wheezing or rales.   Chest:      Chest wall: No tenderness.   Abdominal:      General: Bowel sounds are normal. There is no distension.      Palpations: Abdomen is soft.      Tenderness: There is no abdominal tenderness.   Musculoskeletal:         General: No deformity. Normal range of motion.   Lymphadenopathy:      Cervical: No cervical adenopathy.   Skin:     General: Skin is warm and dry.      Findings: No rash.   Neurological:      Cranial Nerves: No cranial nerve deficit.      Motor: No abnormal muscle tone.                 HEALTH RISK ASSESSMENT    Smoking Status:  Social History     Tobacco Use   Smoking Status Former   • Packs/day: 0.50   • Years: 15.00   • Pack years: 7.50   • Types: Cigarettes   • Start date: 1971   • Quit date: 2001   • Years since quittin.9   Smokeless Tobacco Never     Alcohol Consumption:  Social History     Substance and Sexual Activity   Alcohol Use Not Currently    Comment: socially,rare     Fall Risk Screen:    [facility]    Depression Screening:  PHQ-2/PHQ-9 Depression Screening 2022   Little Interest or Pleasure in Doing Things 0-->not at all   PHQ-9: Brief Depression Severity Measure Score 0       Health Habits and Functional and Cognitive Screening:  Functional & Cognitive Status 2022   Do you have difficulty preparing food and eating? No   Do you have difficulty bathing yourself, getting dressed or grooming yourself? No   Do you have difficulty using the toilet? No   Do you have difficulty moving around from place to place? No   Do you have trouble with steps or getting out of a bed or a chair? No   Current Diet Well Balanced Diet   Dental Exam Up to date   Eye Exam Up to date   Exercise (times per week) 0 times per week   Current Exercises Include No Regular Exercise   Do you need help using the phone?  No    Are you deaf or do you have serious difficulty hearing?  No   Do you need help with transportation? No   Do you need help shopping? No   Do you need help preparing meals?  No   Do you need help with housework?  No   Do you need help with laundry? No   Do you need help taking your medications? No   Do you need help managing money? No   Do you ever drive or ride in a car without wearing a seat belt? No   Have you felt unusual stress, anger or loneliness in the last month? No   Who do you live with? Spouse   If you need help, do you have trouble finding someone available to you? No   Do you have difficulty concentrating, remembering or making decisions? No       Age-appropriate Screening Schedule:  Refer to the list below for future screening recommendations based on patient's age, sex and/or medical conditions. Orders for these recommended tests are listed in the plan section. The patient has been provided with a written plan.    Health Maintenance   Topic Date Due   • DXA SCAN  Never done   • TDAP/TD VACCINES (2 - Tdap) 07/31/2010   • ZOSTER VACCINE (2 of 2) 08/23/2018   • LIPID PANEL  10/04/2022   • MAMMOGRAM  07/06/2023   • INFLUENZA VACCINE  Completed              Assessment & Plan   CMS Preventative Services Quick Reference  Risk Factors Identified During Encounter  Inactivity/Sedentary  The above risks/problems have been discussed with the patient.  Follow up actions/plans if indicated are seen below in the Assessment/Plan Section.  Pertinent information has been shared with the patient in the After Visit Summary.    Diagnoses and all orders for this visit:    1. Medicare annual wellness visit, subsequent (Primary)  -     CBC & Differential    2. Mixed hyperlipidemia  -     Comprehensive Metabolic Panel  -     Lipid Panel With / Chol / HDL Ratio    3. Immunization deficiency  -     COVID-19 Bivalent Booster (Pfizer) 12+yrs    4. Chronic fatigue  -     CBC & Differential    5. Colon cancer screening  -      Ambulatory Referral to Gastroenterology    6. Encounter for hepatitis C screening test for low risk patient  -     Hepatitis C Antibody      Preventive Counseling:  Encouraged to stay active.  Covid vaccine booster today.  HEP A UTD.  Pneumovax UTD.  Colonoscopy scheduled.      Dentist UTD.  Optho UTD.  dexa 7/2020    Follow Up:   Return in about 1 year (around 11/23/2023) for Medicare wellness and OV, 30 minutes.     An After Visit Summary and PPPS were made available to the patient.

## 2022-11-24 LAB
ALBUMIN SERPL-MCNC: 4.6 G/DL (ref 3.5–5.2)
ALBUMIN/GLOB SERPL: 1.9 G/DL
ALP SERPL-CCNC: 55 U/L (ref 39–117)
ALT SERPL-CCNC: 18 U/L (ref 1–33)
AST SERPL-CCNC: 24 U/L (ref 1–32)
BASOPHILS # BLD AUTO: 0.05 10*3/MM3 (ref 0–0.2)
BASOPHILS NFR BLD AUTO: 0.8 % (ref 0–1.5)
BILIRUB SERPL-MCNC: 0.3 MG/DL (ref 0–1.2)
BUN SERPL-MCNC: 14 MG/DL (ref 8–23)
BUN/CREAT SERPL: 14.9 (ref 7–25)
CALCIUM SERPL-MCNC: 10.3 MG/DL (ref 8.6–10.5)
CHLORIDE SERPL-SCNC: 101 MMOL/L (ref 98–107)
CHOLEST SERPL-MCNC: 224 MG/DL (ref 0–200)
CHOLEST/HDLC SERPL: 3.61 {RATIO}
CO2 SERPL-SCNC: 28 MMOL/L (ref 22–29)
CREAT SERPL-MCNC: 0.94 MG/DL (ref 0.57–1)
EGFRCR SERPLBLD CKD-EPI 2021: 65.8 ML/MIN/1.73
EOSINOPHIL # BLD AUTO: 0.16 10*3/MM3 (ref 0–0.4)
EOSINOPHIL NFR BLD AUTO: 2.4 % (ref 0.3–6.2)
ERYTHROCYTE [DISTWIDTH] IN BLOOD BY AUTOMATED COUNT: 12.5 % (ref 12.3–15.4)
GLOBULIN SER CALC-MCNC: 2.4 GM/DL
GLUCOSE SERPL-MCNC: 91 MG/DL (ref 65–99)
HCT VFR BLD AUTO: 40.4 % (ref 34–46.6)
HCV AB S/CO SERPL IA: <0.1 S/CO RATIO (ref 0–0.9)
HDLC SERPL-MCNC: 62 MG/DL (ref 40–60)
HGB BLD-MCNC: 13.8 G/DL (ref 12–15.9)
IMM GRANULOCYTES # BLD AUTO: 0 10*3/MM3 (ref 0–0.05)
IMM GRANULOCYTES NFR BLD AUTO: 0 % (ref 0–0.5)
LDLC SERPL CALC-MCNC: 133 MG/DL (ref 0–100)
LYMPHOCYTES # BLD AUTO: 2.2 10*3/MM3 (ref 0.7–3.1)
LYMPHOCYTES NFR BLD AUTO: 33.2 % (ref 19.6–45.3)
MCH RBC QN AUTO: 30.1 PG (ref 26.6–33)
MCHC RBC AUTO-ENTMCNC: 34.2 G/DL (ref 31.5–35.7)
MCV RBC AUTO: 88 FL (ref 79–97)
MONOCYTES # BLD AUTO: 0.63 10*3/MM3 (ref 0.1–0.9)
MONOCYTES NFR BLD AUTO: 9.5 % (ref 5–12)
NEUTROPHILS # BLD AUTO: 3.59 10*3/MM3 (ref 1.7–7)
NEUTROPHILS NFR BLD AUTO: 54.1 % (ref 42.7–76)
NRBC BLD AUTO-RTO: 0 /100 WBC (ref 0–0.2)
PLATELET # BLD AUTO: 269 10*3/MM3 (ref 140–450)
POTASSIUM SERPL-SCNC: 4.8 MMOL/L (ref 3.5–5.2)
PROT SERPL-MCNC: 7 G/DL (ref 6–8.5)
RBC # BLD AUTO: 4.59 10*6/MM3 (ref 3.77–5.28)
SODIUM SERPL-SCNC: 141 MMOL/L (ref 136–145)
TRIGL SERPL-MCNC: 167 MG/DL (ref 0–150)
VLDLC SERPL CALC-MCNC: 29 MG/DL (ref 5–40)
WBC # BLD AUTO: 6.63 10*3/MM3 (ref 3.4–10.8)

## 2023-01-27 ENCOUNTER — PRE-PROCEDURE SCREENING (OUTPATIENT)
Dept: GASTROENTEROLOGY | Facility: CLINIC | Age: 68
End: 2023-01-27
Payer: MEDICARE

## 2023-01-27 ENCOUNTER — PREP FOR SURGERY (OUTPATIENT)
Dept: SURGERY | Facility: SURGERY CENTER | Age: 68
End: 2023-01-27
Payer: MEDICARE

## 2023-01-27 DIAGNOSIS — Z12.11 ENCOUNTER FOR SCREENING FOR MALIGNANT NEOPLASM OF COLON: Primary | ICD-10-CM

## 2023-01-27 NOTE — TELEPHONE ENCOUNTER
LAST SCOPE 12YRS ( NO RECORDS) --No personal history of polyps--No family history of polyps or colon cancer--No blood thinners--Medications:              B Complex Vitamins (vitamin b complex) capsule capsule  calcium carbonate (OS-JOSIE) 600 MG tablet  Cholecalciferol (VITAMIN D) 2000 units tablet  magnesium oxide (MAGOX) 400 (241.3 Mg) MG tablet tablet  omeprazole (priLOSEC) 40 MG capsule  silver sulfadiazine (Silvadene) 1 % cream           QUESTIONNAIRE SCEEENING  HAS BEEN SENT TO DOCTOR FOR REVIEW

## 2023-01-30 ENCOUNTER — PREP FOR SURGERY (OUTPATIENT)
Dept: OTHER | Facility: HOSPITAL | Age: 68
End: 2023-01-30
Payer: MEDICARE

## 2023-01-30 DIAGNOSIS — Z12.11 ENCOUNTER FOR SCREENING FOR MALIGNANT NEOPLASM OF COLON: Primary | ICD-10-CM

## 2023-01-30 RX ORDER — SODIUM CHLORIDE, SODIUM LACTATE, POTASSIUM CHLORIDE, CALCIUM CHLORIDE 600; 310; 30; 20 MG/100ML; MG/100ML; MG/100ML; MG/100ML
30 INJECTION, SOLUTION INTRAVENOUS CONTINUOUS PRN
Status: CANCELLED | OUTPATIENT
Start: 2023-01-30

## 2023-03-23 NOTE — SIGNIFICANT NOTE
Education provided the Patient on the following:    - Nothing to Eat or Drink after MN the night before the procedure    - Avoid red/purple fluids while completing their bowel prep as ordered by physician  -Contact Gastrointerologist office for any questions about specific details regarding colon prep    -You will need to have someone drive you home after your colonoscopy and remain with you for 24 hours after the procedure  - The date of your procedure, your are welcome to have one visitor at bedside or remain within 10-15 minutes of [facility]  -Please wear warm socks when you arrive for your procedure  -Remove all jewelry and leave any valuables before arriving the day of your procedure (all will have to be removed before leaving preop)  -You will need to arrive at 9:30 on 3/27 procedure    -Feel free to contact us at: [phone] with any additional questions/concerns

## 2023-03-24 DIAGNOSIS — M79.643 PAIN OF HAND, UNSPECIFIED LATERALITY: Primary | ICD-10-CM

## 2023-03-27 ENCOUNTER — HOSPITAL ENCOUNTER (OUTPATIENT)
Facility: SURGERY CENTER | Age: 68
Setting detail: HOSPITAL OUTPATIENT SURGERY
Discharge: HOME | End: 2023-03-27
Attending: INTERNAL MEDICINE | Admitting: INTERNAL MEDICINE
Payer: MEDICARE

## 2023-03-27 ENCOUNTER — ANESTHESIA (OUTPATIENT)
Dept: SURGERY | Facility: SURGERY CENTER | Age: 68
End: 2023-03-27
Payer: MEDICARE

## 2023-03-27 ENCOUNTER — ANESTHESIA EVENT (OUTPATIENT)
Dept: SURGERY | Facility: SURGERY CENTER | Age: 68
End: 2023-03-27
Payer: MEDICARE

## 2023-03-27 VITALS
RESPIRATION RATE: 16 BRPM | DIASTOLIC BLOOD PRESSURE: 59 MMHG | HEART RATE: 68 BPM | SYSTOLIC BLOOD PRESSURE: 118 MMHG | TEMPERATURE: 98 F | OXYGEN SATURATION: 96 %

## 2023-03-27 DIAGNOSIS — Z12.11 ENCOUNTER FOR SCREENING FOR MALIGNANT NEOPLASM OF COLON: ICD-10-CM

## 2023-03-27 PROCEDURE — 45385 COLONOSCOPY W/LESION REMOVAL: CPT | Performed by: INTERNAL MEDICINE

## 2023-03-27 PROCEDURE — 25010000002 PROPOFOL 10 MG/ML EMULSION: Performed by: ANESTHESIOLOGY

## 2023-03-27 PROCEDURE — 25010000002 PROPOFOL 1000 MG/100ML EMULSION: Performed by: ANESTHESIOLOGY

## 2023-03-27 PROCEDURE — 88305 TISSUE EXAM BY PATHOLOGIST: CPT | Performed by: INTERNAL MEDICINE

## 2023-03-27 RX ORDER — LIDOCAINE HYDROCHLORIDE 20 MG/ML
INJECTION, SOLUTION INFILTRATION; PERINEURAL AS NEEDED
Status: DISCONTINUED | OUTPATIENT
Start: 2023-03-27 | End: 2023-03-27 | Stop reason: SURG

## 2023-03-27 RX ORDER — PROPOFOL 10 MG/ML
INJECTION, EMULSION INTRAVENOUS AS NEEDED
Status: DISCONTINUED | OUTPATIENT
Start: 2023-03-27 | End: 2023-03-27 | Stop reason: SURG

## 2023-03-27 RX ORDER — SODIUM CHLORIDE, SODIUM LACTATE, POTASSIUM CHLORIDE, CALCIUM CHLORIDE 600; 310; 30; 20 MG/100ML; MG/100ML; MG/100ML; MG/100ML
30 INJECTION, SOLUTION INTRAVENOUS CONTINUOUS PRN
Status: DISCONTINUED | OUTPATIENT
Start: 2023-03-27 | End: 2023-03-27 | Stop reason: HOSPADM

## 2023-03-27 RX ORDER — MAGNESIUM HYDROXIDE 1200 MG/15ML
LIQUID ORAL AS NEEDED
Status: DISCONTINUED | OUTPATIENT
Start: 2023-03-27 | End: 2023-03-27 | Stop reason: HOSPADM

## 2023-03-27 RX ADMIN — PROPOFOL 200 MCG/KG/MIN: 10 INJECTION, EMULSION INTRAVENOUS at 09:44

## 2023-03-27 RX ADMIN — SODIUM CHLORIDE, SODIUM LACTATE, POTASSIUM CHLORIDE, AND CALCIUM CHLORIDE 30 ML/HR: .6; .31; .03; .02 INJECTION, SOLUTION INTRAVENOUS at 09:31

## 2023-03-27 RX ADMIN — LIDOCAINE HYDROCHLORIDE 50 MG: 20 INJECTION, SOLUTION INFILTRATION; PERINEURAL at 09:44

## 2023-03-27 RX ADMIN — PROPOFOL 140 MG: 10 INJECTION, EMULSION INTRAVENOUS at 09:44

## 2023-03-27 NOTE — H&P
No chief complaint on file.      HPI  Patient today for screening colonoscopy.         Problem List:    Patient Active Problem List   Diagnosis   • Gastro-esophageal reflux disease without esophagitis   • Hyperlipidemia   • Insomnia   • Obesity   • Vitamin D deficiency   • Medicare annual wellness visit, subsequent   • Chronic fatigue   • Cough   • Burn   • Immunization deficiency   • Colon cancer screening   • Encounter for hepatitis C screening test for low risk patient   • Hand pain       Medical History:    Past Medical History:   Diagnosis Date   • Chest pain    • Chronic lung disease    • COPD (chronic obstructive pulmonary disease) (HCC)    • GERD (gastroesophageal reflux disease)    • Hyperlipidemia    • Postmenopausal    • SVT (supraventricular tachycardia) (HCC)         Social History:    Social History     Socioeconomic History   • Marital status:    Tobacco Use   • Smoking status: Former     Packs/day: 0.50     Years: 15.00     Pack years: 7.50     Types: Cigarettes     Start date: 1971     Quit date: 2001     Years since quittin.2   • Smokeless tobacco: Never   Vaping Use   • Vaping Use: Never used   Substance and Sexual Activity   • Alcohol use: Not Currently     Comment: socially,rare   • Drug use: Never   • Sexual activity: Not Currently     Partners: Male       Family History:   Family History   Problem Relation Age of Onset   • Atrial fibrillation Mother    • Cerebral aneurysm Mother    • Transient ischemic attack Mother    • Hypertension Mother    • Breast cancer Mother    • Arthritis Mother    • Diabetes type I Father    • Kidney disease Father        Surgical History:   Past Surgical History:   Procedure Laterality Date   • ABLATION OF DYSRHYTHMIC FOCUS     • CARDIAC CATHETERIZATION     •  SECTION     • CHOLECYSTECTOMY     • HYSTERECTOMY     • KNEE ARTHROSCOPY Bilateral     meniscus repair       No current facility-administered medications for this  encounter.    Allergies:   Allergies   Allergen Reactions   • Lodine [Etodolac]         The following portions of the patient's history were reviewed by me and updated as appropriate: review of systems, allergies, current medications, past family history, past medical history, past social history, past surgical history and problem list.    There were no vitals filed for this visit.    PHYSICAL EXAM:    CONSTITUTIONAL:  today's vital signs reviewed by me  GASTROINTESTINAL: abdomen is soft nontender nondistended with normal active bowel sounds, no masses are appreciated    Assessment/ Plan  We will proceed today with screening colonoscopy.    Risks and benefits as well as alternatives to endoscopic evaluation were explained to the patient and they voiced understanding and wish to proceed.  These risks include but are not limited to the risk of bleeding, perforation, adverse reaction to sedation, and missed lesions.  The patient was given the opportunity to ask questions prior to the endoscopic procedure.

## 2023-03-27 NOTE — ANESTHESIA POSTPROCEDURE EVALUATION
Patient: Sweta Ceja    Procedure Summary     Date: 03/27/23 Room / Location: [facility]    Anesthesia Start: 0939 Anesthesia Stop: 1005    Procedure: COLONOSCOPY FOR SCREENING Diagnosis:       Encounter for screening for malignant neoplasm of colon      (Encounter for screening for malignant neoplasm of colon [Z12.11])    Surgeons: Celestino Meza MD Provider: Andrea Horn DO    Anesthesia Type: MAC ASA Status: 2          Anesthesia Type: MAC    Vitals  Vitals Value Taken Time   /74 03/27/23 1020   Temp 36.7 °C (98 °F) 03/27/23 1005   Pulse 68 03/27/23 1020   Resp 16 03/27/23 1010   SpO2 96 % 03/27/23 1020   Vitals shown include unvalidated device data.        Post Anesthesia Care and Evaluation    Patient location during evaluation: bedside  Patient participation: waiting for patient participation  Level of consciousness: sleepy but conscious and responsive to verbal stimuli  Pain management: adequate    Airway patency: patent  Anesthetic complications: No anesthetic complications  PONV Status: NA  Cardiovascular status: acceptable and hemodynamically stable  Respiratory status: acceptable, spontaneous ventilation and nonlabored ventilation  Hydration status: acceptable    Comments: /74 (BP Location: Left arm, Patient Position: Lying)   Pulse 74   Temp 36.7 °C (98 °F) (Temporal)   Resp 16   SpO2 96%

## 2023-03-28 LAB
LAB AP CASE REPORT: NORMAL
PATH REPORT.FINAL DX SPEC: NORMAL
PATH REPORT.GROSS SPEC: NORMAL

## 2023-05-23 ENCOUNTER — TELEPHONE (OUTPATIENT)
Dept: FAMILY MEDICINE CLINIC | Facility: CLINIC | Age: 68
End: 2023-05-23

## 2023-05-23 RX ORDER — PREDNISONE 20 MG/1
TABLET ORAL
Qty: 18 TABLET | Refills: 0 | Status: SHIPPED | OUTPATIENT
Start: 2023-05-23

## 2023-05-23 NOTE — TELEPHONE ENCOUNTER
Caller: Sweta Ceja    Relationship: Self    Best call back number: [phone]    What medication are you requesting: STEROID     What are your current symptoms: POISON IVY ON WRISTS, BOTH ARMS, FOOT, BACK-- PATIENT STATES THAT THERE ARE CLUSTERS     How long have you been experiencing symptoms: 05/16    Have you had these symptoms before:    [] Yes  [x] No    Have you been treated for these symptoms before:   [] Yes  [x] No    If a prescription is needed, what is your preferred pharmacy and phone number:  [place] [id number] [facility] [address] [facility] [phone] [facility] [phone] [facility]    Additional notes: PATIENT HAS TRIED CALAMINE LOTION, ALCOHOL AND NOTHING HAS BEEN WORKING.

## 2023-05-30 ENCOUNTER — HOSPITAL ENCOUNTER (EMERGENCY)
Facility: HOSPITAL | Age: 68
Discharge: HOME | End: 2023-05-30
Attending: EMERGENCY MEDICINE | Admitting: EMERGENCY MEDICINE

## 2023-05-30 ENCOUNTER — APPOINTMENT (OUTPATIENT)
Dept: GENERAL RADIOLOGY | Facility: HOSPITAL | Age: 68
End: 2023-05-30

## 2023-05-30 VITALS
RESPIRATION RATE: 20 BRPM | HEART RATE: 82 BPM | TEMPERATURE: 98.9 F | BODY MASS INDEX: 31.65 KG/M2 | DIASTOLIC BLOOD PRESSURE: 62 MMHG | SYSTOLIC BLOOD PRESSURE: 119 MMHG | HEIGHT: 65 IN | OXYGEN SATURATION: 95 % | WEIGHT: 190 LBS

## 2023-05-30 DIAGNOSIS — R07.89 ATYPICAL CHEST PAIN: ICD-10-CM

## 2023-05-30 DIAGNOSIS — R05.9 COUGH, UNSPECIFIED TYPE: Primary | ICD-10-CM

## 2023-05-30 LAB
ALBUMIN SERPL-MCNC: 4.2 G/DL (ref 3.5–5.2)
ALBUMIN/GLOB SERPL: 1.7 G/DL
ALP SERPL-CCNC: 47 U/L (ref 39–117)
ALT SERPL W P-5'-P-CCNC: 15 U/L (ref 1–33)
ANION GAP SERPL CALCULATED.3IONS-SCNC: 8 MMOL/L (ref 5–15)
AST SERPL-CCNC: 15 U/L (ref 1–32)
B PARAPERT DNA SPEC QL NAA+PROBE: NOT DETECTED
B PERT DNA SPEC QL NAA+PROBE: NOT DETECTED
BASOPHILS # BLD AUTO: 0.06 10*3/MM3 (ref 0–0.2)
BASOPHILS NFR BLD AUTO: 0.8 % (ref 0–1.5)
BILIRUB SERPL-MCNC: 0.6 MG/DL (ref 0–1.2)
BUN SERPL-MCNC: 15 MG/DL (ref 8–23)
BUN/CREAT SERPL: 17.9 (ref 7–25)
C PNEUM DNA NPH QL NAA+NON-PROBE: NOT DETECTED
CALCIUM SPEC-SCNC: 8.8 MG/DL (ref 8.6–10.5)
CHLORIDE SERPL-SCNC: 104 MMOL/L (ref 98–107)
CO2 SERPL-SCNC: 28 MMOL/L (ref 22–29)
CREAT SERPL-MCNC: 0.84 MG/DL (ref 0.57–1)
DEPRECATED RDW RBC AUTO: 40 FL (ref 37–54)
EGFRCR SERPLBLD CKD-EPI 2021: 74.9 ML/MIN/1.73
EOSINOPHIL # BLD AUTO: 0.15 10*3/MM3 (ref 0–0.4)
EOSINOPHIL NFR BLD AUTO: 1.9 % (ref 0.3–6.2)
ERYTHROCYTE [DISTWIDTH] IN BLOOD BY AUTOMATED COUNT: 12.6 % (ref 12.3–15.4)
FLUAV SUBTYP SPEC NAA+PROBE: NOT DETECTED
FLUBV RNA ISLT QL NAA+PROBE: NOT DETECTED
GEN 5 2HR TROPONIN T REFLEX: 8 NG/L
GLOBULIN UR ELPH-MCNC: 2.5 GM/DL
GLUCOSE SERPL-MCNC: 97 MG/DL (ref 65–99)
HADV DNA SPEC NAA+PROBE: NOT DETECTED
HCOV 229E RNA SPEC QL NAA+PROBE: NOT DETECTED
HCOV HKU1 RNA SPEC QL NAA+PROBE: NOT DETECTED
HCOV NL63 RNA SPEC QL NAA+PROBE: NOT DETECTED
HCOV OC43 RNA SPEC QL NAA+PROBE: NOT DETECTED
HCT VFR BLD AUTO: 40.3 % (ref 34–46.6)
HGB BLD-MCNC: 13.9 G/DL (ref 12–15.9)
HMPV RNA NPH QL NAA+NON-PROBE: NOT DETECTED
HOLD SPECIMEN: NORMAL
HPIV1 RNA ISLT QL NAA+PROBE: NOT DETECTED
HPIV2 RNA SPEC QL NAA+PROBE: NOT DETECTED
HPIV3 RNA NPH QL NAA+PROBE: NOT DETECTED
HPIV4 P GENE NPH QL NAA+PROBE: NOT DETECTED
IMM GRANULOCYTES # BLD AUTO: 0.06 10*3/MM3 (ref 0–0.05)
IMM GRANULOCYTES NFR BLD AUTO: 0.8 % (ref 0–0.5)
LIPASE SERPL-CCNC: 26 U/L (ref 13–60)
LYMPHOCYTES # BLD AUTO: 2.32 10*3/MM3 (ref 0.7–3.1)
LYMPHOCYTES NFR BLD AUTO: 29.9 % (ref 19.6–45.3)
M PNEUMO IGG SER IA-ACNC: NOT DETECTED
MCH RBC QN AUTO: 29.8 PG (ref 26.6–33)
MCHC RBC AUTO-ENTMCNC: 34.5 G/DL (ref 31.5–35.7)
MCV RBC AUTO: 86.5 FL (ref 79–97)
MONOCYTES # BLD AUTO: 0.75 10*3/MM3 (ref 0.1–0.9)
MONOCYTES NFR BLD AUTO: 9.7 % (ref 5–12)
NEUTROPHILS NFR BLD AUTO: 4.41 10*3/MM3 (ref 1.7–7)
NEUTROPHILS NFR BLD AUTO: 56.9 % (ref 42.7–76)
NRBC BLD AUTO-RTO: 0 /100 WBC (ref 0–0.2)
NT-PROBNP SERPL-MCNC: 245 PG/ML (ref 0–900)
PLATELET # BLD AUTO: 256 10*3/MM3 (ref 140–450)
PMV BLD AUTO: 8.8 FL (ref 6–12)
POTASSIUM SERPL-SCNC: 3.9 MMOL/L (ref 3.5–5.2)
PROT SERPL-MCNC: 6.7 G/DL (ref 6–8.5)
QT INTERVAL: 373 MS
RBC # BLD AUTO: 4.66 10*6/MM3 (ref 3.77–5.28)
RHINOVIRUS RNA SPEC NAA+PROBE: NOT DETECTED
RSV RNA NPH QL NAA+NON-PROBE: NOT DETECTED
SARS-COV-2 RNA NPH QL NAA+NON-PROBE: NOT DETECTED
SODIUM SERPL-SCNC: 140 MMOL/L (ref 136–145)
TROPONIN T DELTA: 2 NG/L
TROPONIN T SERPL HS-MCNC: 6 NG/L
WBC NRBC COR # BLD: 7.75 10*3/MM3 (ref 3.4–10.8)
WHOLE BLOOD HOLD COAG: NORMAL

## 2023-05-30 PROCEDURE — 80053 COMPREHEN METABOLIC PANEL: CPT | Performed by: EMERGENCY MEDICINE

## 2023-05-30 PROCEDURE — 71045 X-RAY EXAM CHEST 1 VIEW: CPT

## 2023-05-30 PROCEDURE — 93005 ELECTROCARDIOGRAM TRACING: CPT | Performed by: EMERGENCY MEDICINE

## 2023-05-30 PROCEDURE — 36415 COLL VENOUS BLD VENIPUNCTURE: CPT

## 2023-05-30 PROCEDURE — 84484 ASSAY OF TROPONIN QUANT: CPT | Performed by: EMERGENCY MEDICINE

## 2023-05-30 PROCEDURE — 83880 ASSAY OF NATRIURETIC PEPTIDE: CPT | Performed by: EMERGENCY MEDICINE

## 2023-05-30 PROCEDURE — 85025 COMPLETE CBC W/AUTO DIFF WBC: CPT | Performed by: EMERGENCY MEDICINE

## 2023-05-30 PROCEDURE — 0202U NFCT DS 22 TRGT SARS-COV-2: CPT | Performed by: EMERGENCY MEDICINE

## 2023-05-30 PROCEDURE — 99284 EMERGENCY DEPT VISIT MOD MDM: CPT

## 2023-05-30 PROCEDURE — 83690 ASSAY OF LIPASE: CPT | Performed by: EMERGENCY MEDICINE

## 2023-05-30 RX ORDER — SODIUM CHLORIDE 0.9 % (FLUSH) 0.9 %
10 SYRINGE (ML) INJECTION AS NEEDED
Status: DISCONTINUED | OUTPATIENT
Start: 2023-05-30 | End: 2023-05-30 | Stop reason: HOSPADM

## 2023-05-30 RX ORDER — GUAIFENESIN AND CODEINE PHOSPHATE 100; 10 MG/5ML; MG/5ML
5 SOLUTION ORAL NIGHTLY PRN
Qty: 35 ML | Refills: 0 | Status: SHIPPED | OUTPATIENT
Start: 2023-05-30 | End: 2023-06-06

## 2023-05-30 NOTE — ED NOTES
Pt arrives ambulatory to triage for neck pain, headache. Pt reports she broke out in a cold sweat and felt lightheaded. Pt also reports a cough and a sore throat that started yesterday. Pt just finished prednisone and thinks she is having a reaction to it
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
## 2023-06-05 ENCOUNTER — OFFICE VISIT (OUTPATIENT)
Dept: FAMILY MEDICINE CLINIC | Facility: CLINIC | Age: 68
End: 2023-06-05
Payer: MEDICARE

## 2023-06-05 VITALS
TEMPERATURE: 99.6 F | HEIGHT: 65 IN | HEART RATE: 77 BPM | DIASTOLIC BLOOD PRESSURE: 88 MMHG | WEIGHT: 186.4 LBS | SYSTOLIC BLOOD PRESSURE: 164 MMHG | BODY MASS INDEX: 31.06 KG/M2 | OXYGEN SATURATION: 97 %

## 2023-06-05 DIAGNOSIS — R40.4 TRANSIENT ALTERATION OF AWARENESS: ICD-10-CM

## 2023-06-05 DIAGNOSIS — R47.9 DIFFICULTY WITH SPEECH: ICD-10-CM

## 2023-06-05 DIAGNOSIS — I10 PRIMARY HYPERTENSION: ICD-10-CM

## 2023-06-05 DIAGNOSIS — R42 POSTURAL DIZZINESS WITH PRESYNCOPE: Primary | ICD-10-CM

## 2023-06-05 DIAGNOSIS — R55 POSTURAL DIZZINESS WITH PRESYNCOPE: Primary | ICD-10-CM

## 2023-06-05 PROBLEM — R41.82 CHANGE IN MENTAL STATUS: Status: ACTIVE | Noted: 2023-06-05

## 2023-06-05 PROCEDURE — 99214 OFFICE O/P EST MOD 30 MIN: CPT | Performed by: FAMILY MEDICINE

## 2023-06-05 PROCEDURE — 3077F SYST BP >= 140 MM HG: CPT | Performed by: FAMILY MEDICINE

## 2023-06-05 PROCEDURE — 3079F DIAST BP 80-89 MM HG: CPT | Performed by: FAMILY MEDICINE

## 2023-06-05 RX ORDER — AMLODIPINE BESYLATE 5 MG/1
5 TABLET ORAL DAILY
Qty: 90 TABLET | Refills: 1 | Status: SHIPPED | OUTPATIENT
Start: 2023-06-05

## 2023-06-05 RX ORDER — ASCORBATE CALCIUM/BIOFLAVONOID 1000-200MG
TABLET, EXTENDED RELEASE ORAL
COMMUNITY
Start: 2023-03-03

## 2023-06-05 NOTE — PROGRESS NOTES
Chief Complaint   Patient presents with    Hospital Follow Up Visit       Subjective   Sweta Dumas Call is a 68 y.o. female.     History of Present Illness   C/o cough for 1 week.  6 days ago could not form words when picked up the phone.  Lasted seconds.  Was dizzy and shaking.  Had a bad headache that morning.  [place] like I was gong to pass out.  No F/C.    CBC normal from ER . Resp panel neg.    The following portions of the patient's history were reviewed and updated as appropriate: allergies, current medications, past family history, past medical history, past social history, past surgical history and problem list.    Review of Systems   Constitutional:  Negative for appetite change and fatigue.   HENT:  Negative for nosebleeds and sore throat.    Eyes:  Negative for blurred vision and visual disturbance.   Respiratory:  Negative for shortness of breath and wheezing.    Cardiovascular:  Negative for chest pain and leg swelling.   Gastrointestinal:  Negative for abdominal distention and abdominal pain.   Endocrine: Negative for cold intolerance and polyuria.   Genitourinary:  Negative for dysuria and hematuria.   Musculoskeletal:  Negative for arthralgias and myalgias.   Skin:  Negative for color change and rash.   Neurological:  Positive for speech difficulty and confusion. Negative for weakness.   Psychiatric/Behavioral:  Negative for agitation and depressed mood.      Patient Active Problem List   Diagnosis    Gastro-esophageal reflux disease without esophagitis    Hyperlipidemia    Insomnia    Obesity    Vitamin D deficiency    Medicare annual wellness visit, subsequent    Chronic fatigue    Cough    Burn    Immunization deficiency    Colon cancer screening    Encounter for hepatitis C screening test for low risk patient    Hand pain    Difficulty with speech    Postural dizziness with presyncope    Change in mental status    Primary hypertension       Allergies   Allergen Reactions    Lodine [Etodolac]           Current Outpatient Medications:     B Complex Vitamins (vitamin b complex) capsule capsule, Take 1 capsule by mouth Daily., Disp: , Rfl:     calcium carbonate (OS-JOSIE) 600 MG tablet, Take 1 tablet by mouth Daily., Disp: , Rfl:     Cholecalciferol (VITAMIN D) 2000 units tablet, Take 2,000 Units by mouth Daily., Disp: , Rfl:     guaiFENesin-codeine (GUAIFENESIN AC) 100-10 MG/5ML liquid, Take 5 mL by mouth At Night As Needed for Cough for up to 7 days., Disp: 35 mL, Rfl: 0    magnesium oxide (MAGOX) 400 (241.3 Mg) MG tablet tablet, Take 1 tablet by mouth Daily., Disp: , Rfl:     Multiple Minerals (Calcium-Magnesium-Zinc) tablet, , Disp: , Rfl:     omeprazole (priLOSEC) 40 MG capsule, TAKE 1 CAPSULE DAILY, Disp: 90 capsule, Rfl: 3    predniSONE (DELTASONE) 20 MG tablet, 3 a day for 3 days then 2 a day for 3 days then 1 a day for 3 days., Disp: 18 tablet, Rfl: 0    Past Medical History:   Diagnosis Date    Chest pain     Chronic lung disease     COPD (chronic obstructive pulmonary disease)     GERD (gastroesophageal reflux disease)     Hyperlipidemia     Postmenopausal     SVT (supraventricular tachycardia)        Past Surgical History:   Procedure Laterality Date    ABLATION OF DYSRHYTHMIC FOCUS      CARDIAC CATHETERIZATION       SECTION      CHOLECYSTECTOMY      COLONOSCOPY N/A 3/27/2023    Procedure: COLONOSCOPY FOR SCREENING;  Surgeon: Celestino Meza MD;  [place] [facility] [place]  Service: Gastroenterology;  Laterality: N/A;  diverticulosis, polyps, hemorrhoids    HYSTERECTOMY      KNEE ARTHROSCOPY Bilateral     meniscus repair       Family History   Problem Relation Age of Onset    Atrial fibrillation Mother     Cerebral aneurysm Mother     Transient ischemic attack Mother     Hypertension Mother     Breast cancer Mother     Arthritis Mother     Diabetes type I Father     Kidney disease Father        Social History     Tobacco Use    Smoking status: Former     Packs/day: 0.50     Years: 15.00      Pack years: 7.50     Types: Cigarettes     Start date: 1971     Quit date: 2001     Years since quittin.4    Smokeless tobacco: Never   Substance Use Topics    Alcohol use: Not Currently     Comment: socially,rare            Objective     Vitals:    23 1147   BP: 164/88   Pulse:    Temp:    SpO2:      Body mass index is 31.02 kg/m².    Physical Exam  Vitals reviewed.   Constitutional:       Appearance: She is well-developed. She is not diaphoretic.   HENT:      Head: Normocephalic and atraumatic.   Eyes:      General: No scleral icterus.     Pupils: Pupils are equal, round, and reactive to light.   Neck:      Thyroid: No thyromegaly.   Cardiovascular:      Rate and Rhythm: Normal rate and regular rhythm.      Heart sounds: No murmur heard.    No friction rub. No gallop.   Pulmonary:      Effort: Pulmonary effort is normal. No respiratory distress.      Breath sounds: No wheezing or rales.   Chest:      Chest wall: No tenderness.   Abdominal:      General: Bowel sounds are normal. There is no distension.      Palpations: Abdomen is soft.      Tenderness: There is no abdominal tenderness.   Musculoskeletal:         General: No deformity. Normal range of motion.   Lymphadenopathy:      Cervical: No cervical adenopathy.   Skin:     General: Skin is warm and dry.      Findings: No rash.   Neurological:      Cranial Nerves: No cranial nerve deficit.      Motor: No abnormal muscle tone.       Lab Results   Component Value Date    GLUCOSE 97 2023    BUN 15 2023    CREATININE 0.84 2023    EGFRIFNONA 78 10/04/2021    EGFRIFAFRI 95 10/04/2021    BCR 17.9 2023    K 3.9 2023    CO2 28.0 2023    CALCIUM 8.8 2023    PROTENTOTREF 7.0 2022    ALBUMIN 4.2 2023    LABIL2 1.9 2022    AST 15 2023    ALT 15 2023       WBC   Date Value Ref Range Status   2023 7.75 3.40 - 10.80 10*3/mm3 Final   2022 6.63 3.40 - 10.80 10*3/mm3 Final      RBC   Date Value Ref Range Status   05/30/2023 4.66 3.77 - 5.28 10*6/mm3 Final   11/23/2022 4.59 3.77 - 5.28 10*6/mm3 Final     Hemoglobin   Date Value Ref Range Status   05/30/2023 13.9 12.0 - 15.9 g/dL Final     Hematocrit   Date Value Ref Range Status   05/30/2023 40.3 34.0 - 46.6 % Final     MCV   Date Value Ref Range Status   05/30/2023 86.5 79.0 - 97.0 fL Final     MCH   Date Value Ref Range Status   05/30/2023 29.8 26.6 - 33.0 pg Final     MCHC   Date Value Ref Range Status   05/30/2023 34.5 31.5 - 35.7 g/dL Final     RDW   Date Value Ref Range Status   05/30/2023 12.6 12.3 - 15.4 % Final     RDW-SD   Date Value Ref Range Status   05/30/2023 40.0 37.0 - 54.0 fl Final     MPV   Date Value Ref Range Status   05/30/2023 8.8 6.0 - 12.0 fL Final     Platelets   Date Value Ref Range Status   05/30/2023 256 140 - 450 10*3/mm3 Final     Neutrophil %   Date Value Ref Range Status   05/30/2023 56.9 42.7 - 76.0 % Final     Lymphocyte %   Date Value Ref Range Status   05/30/2023 29.9 19.6 - 45.3 % Final     Monocyte %   Date Value Ref Range Status   05/30/2023 9.7 5.0 - 12.0 % Final     Eosinophil %   Date Value Ref Range Status   05/30/2023 1.9 0.3 - 6.2 % Final     Basophil %   Date Value Ref Range Status   05/30/2023 0.8 0.0 - 1.5 % Final     Immature Grans %   Date Value Ref Range Status   05/30/2023 0.8 (H) 0.0 - 0.5 % Final     Neutrophils, Absolute   Date Value Ref Range Status   05/30/2023 4.41 1.70 - 7.00 10*3/mm3 Final     Lymphocytes, Absolute   Date Value Ref Range Status   05/30/2023 2.32 0.70 - 3.10 10*3/mm3 Final     Monocytes, Absolute   Date Value Ref Range Status   05/30/2023 0.75 0.10 - 0.90 10*3/mm3 Final     Eosinophils, Absolute   Date Value Ref Range Status   05/30/2023 0.15 0.00 - 0.40 10*3/mm3 Final     Basophils, Absolute   Date Value Ref Range Status   05/30/2023 0.06 0.00 - 0.20 10*3/mm3 Final     Immature Grans, Absolute   Date Value Ref Range Status   05/30/2023 0.06 (H) 0.00 - 0.05  10*3/mm3 Final     nRBC   Date Value Ref Range Status   05/30/2023 0.0 0.0 - 0.2 /100 WBC Final       No results found for: HGBA1C    Lab Results   Component Value Date    MUJIGABS54 1,323 (H) 10/04/2021       TSH   Date Value Ref Range Status   10/04/2021 0.776 0.270 - 4.200 uIU/mL Final       No results found for: CHOL  Lab Results   Component Value Date    TRIG 167 (H) 11/23/2022     Lab Results   Component Value Date    HDL 62 (H) 11/23/2022     Lab Results   Component Value Date     (H) 11/23/2022     Lab Results   Component Value Date    VLDL 29 11/23/2022     No results found for: LDLHDL      Procedures    Assessment & Plan   Problems Addressed this Visit       Change in mental status    Difficulty with speech    Postural dizziness with presyncope - Primary    Primary hypertension     Diagnoses         Codes Comments    Postural dizziness with presyncope    -  Primary ICD-10-CM: R42, R55  ICD-9-CM: 780.4, 780.2     Difficulty with speech     ICD-10-CM: R47.9  ICD-9-CM: 784.59     Transient alteration of awareness     ICD-10-CM: R40.4  ICD-9-CM: 780.02     Primary hypertension     ICD-10-CM: I10  ICD-9-CM: 401.9           Speech difficulty with transient change of consciousness.  New problem.  CBC normal CMP normal.  Check u/s of carotid.    HTN.  Uncontrolled.  New problem.  Start amlo 2.5 a day.    No orders of the defined types were placed in this encounter.      Current Outpatient Medications   Medication Sig Dispense Refill    B Complex Vitamins (vitamin b complex) capsule capsule Take 1 capsule by mouth Daily.      calcium carbonate (OS-JOSIE) 600 MG tablet Take 1 tablet by mouth Daily.      Cholecalciferol (VITAMIN D) 2000 units tablet Take 2,000 Units by mouth Daily.      guaiFENesin-codeine (GUAIFENESIN AC) 100-10 MG/5ML liquid Take 5 mL by mouth At Night As Needed for Cough for up to 7 days. 35 mL 0    magnesium oxide (MAGOX) 400 (241.3 Mg) MG tablet tablet Take 1 tablet by mouth Daily.       Multiple Minerals (Calcium-Magnesium-Zinc) tablet       omeprazole (priLOSEC) 40 MG capsule TAKE 1 CAPSULE DAILY 90 capsule 3    predniSONE (DELTASONE) 20 MG tablet 3 a day for 3 days then 2 a day for 3 days then 1 a day for 3 days. 18 tablet 0     No current facility-administered medications for this visit.       Sweta Ceja had no medications administered during this visit.    No follow-ups on file.    There are no Patient Instructions on file for this visit.

## 2023-06-09 ENCOUNTER — HOSPITAL ENCOUNTER (OUTPATIENT)
Dept: CARDIOLOGY | Facility: HOSPITAL | Age: 68
Discharge: HOME | End: 2023-06-09
Payer: MEDICARE

## 2023-06-09 DIAGNOSIS — R40.4 TRANSIENT ALTERATION OF AWARENESS: ICD-10-CM

## 2023-06-09 DIAGNOSIS — R42 POSTURAL DIZZINESS WITH PRESYNCOPE: ICD-10-CM

## 2023-06-09 DIAGNOSIS — R47.9 DIFFICULTY WITH SPEECH: ICD-10-CM

## 2023-06-09 DIAGNOSIS — R55 POSTURAL DIZZINESS WITH PRESYNCOPE: ICD-10-CM

## 2023-06-09 LAB
BH CV XLRA MEAS LEFT DIST CCA EDV: 24 CM/SEC
BH CV XLRA MEAS LEFT DIST CCA PSV: 93.8 CM/SEC
BH CV XLRA MEAS LEFT DIST ICA EDV: -25.8 CM/SEC
BH CV XLRA MEAS LEFT DIST ICA PSV: -77.4 CM/SEC
BH CV XLRA MEAS LEFT ICA/CCA RATIO: 0.94
BH CV XLRA MEAS LEFT MID ICA EDV: -28.1 CM/SEC
BH CV XLRA MEAS LEFT MID ICA PSV: -80.9 CM/SEC
BH CV XLRA MEAS LEFT PROX CCA EDV: 17 CM/SEC
BH CV XLRA MEAS LEFT PROX CCA PSV: 100 CM/SEC
BH CV XLRA MEAS LEFT PROX ECA EDV: -14.7 CM/SEC
BH CV XLRA MEAS LEFT PROX ECA PSV: -88.5 CM/SEC
BH CV XLRA MEAS LEFT PROX ICA EDV: -25.2 CM/SEC
BH CV XLRA MEAS LEFT PROX ICA PSV: -87.4 CM/SEC
BH CV XLRA MEAS LEFT PROX SCLA PSV: 112 CM/SEC
BH CV XLRA MEAS LEFT VERTEBRAL A EDV: 13.8 CM/SEC
BH CV XLRA MEAS LEFT VERTEBRAL A PSV: 65.6 CM/SEC
BH CV XLRA MEAS RIGHT DIST CCA EDV: -21.1 CM/SEC
BH CV XLRA MEAS RIGHT DIST CCA PSV: -78.6 CM/SEC
BH CV XLRA MEAS RIGHT DIST ICA EDV: -25.8 CM/SEC
BH CV XLRA MEAS RIGHT DIST ICA PSV: -92.6 CM/SEC
BH CV XLRA MEAS RIGHT ICA/CCA RATIO: 1.18
BH CV XLRA MEAS RIGHT MID ICA EDV: -24 CM/SEC
BH CV XLRA MEAS RIGHT MID ICA PSV: -76.2 CM/SEC
BH CV XLRA MEAS RIGHT PROX CCA EDV: 17 CM/SEC
BH CV XLRA MEAS RIGHT PROX CCA PSV: 89.7 CM/SEC
BH CV XLRA MEAS RIGHT PROX ECA EDV: -11.7 CM/SEC
BH CV XLRA MEAS RIGHT PROX ECA PSV: -78 CM/SEC
BH CV XLRA MEAS RIGHT PROX ICA EDV: -28.7 CM/SEC
BH CV XLRA MEAS RIGHT PROX ICA PSV: -78.6 CM/SEC
BH CV XLRA MEAS RIGHT PROX SCLA PSV: 126 CM/SEC
BH CV XLRA MEAS RIGHT VERTEBRAL A EDV: 16.9 CM/SEC
BH CV XLRA MEAS RIGHT VERTEBRAL A PSV: 61.7 CM/SEC

## 2023-06-09 PROCEDURE — 93880 EXTRACRANIAL BILAT STUDY: CPT

## 2023-08-24 DIAGNOSIS — I10 PRIMARY HYPERTENSION: ICD-10-CM

## 2023-08-24 RX ORDER — AMLODIPINE BESYLATE 5 MG/1
5 TABLET ORAL DAILY
Qty: 90 TABLET | Refills: 1 | Status: SHIPPED | OUTPATIENT
Start: 2023-08-24

## 2023-08-25 ENCOUNTER — TELEPHONE (OUTPATIENT)
Dept: OBSTETRICS AND GYNECOLOGY | Age: 68
End: 2023-08-25
Payer: MEDICARE

## 2023-08-25 DIAGNOSIS — Z12.31 VISIT FOR SCREENING MAMMOGRAM: Primary | ICD-10-CM

## 2023-08-25 NOTE — TELEPHONE ENCOUNTER
Caller: Sweta Ceja    Relationship to patient: Self    Best call back number: [phone]    PT WAS CALLING TO SCHEDULE AS A NEW GYN AND SCHEDULE FOR A MAMMOGRAM AS WELL.. PT HAS AN APPT WITH MARLENY VALDEZ 10/13/23 . PT WOULD LIKE TO SCHEDULE HER MAMMOGRAM AS SOON AS SHE'S ABLE TO HER LAST MAMMO WAS 08/31/23@ [facility]  HER MOTHER HAD BREAST CANCER AND JUST WANTS TO KEEP HER MAMMOS AROUND THE SAME TIME EVERY YEAR. .  PLEASE ADVISE PT AT NUMBER ABOVE AT ANYTIME

## 2023-09-11 ENCOUNTER — TELEPHONE (OUTPATIENT)
Dept: FAMILY MEDICINE CLINIC | Facility: CLINIC | Age: 68
End: 2023-09-11

## 2023-09-11 NOTE — TELEPHONE ENCOUNTER
Caller: Brenna Criselda    Relationship to patient: Self    Best call back number: [phone]     Chief complaint: NO CHIEF COMPLAINT    Type of visit: MEDICARE WELLNESS VISIT     Requested date: 11/27/2023 AROUND 9:30AM    If rescheduling, when is the original appointment: 11/27/2023 AT 8:15AM    Additional notes: PATIENT'S  IS ALSO SCHEDULED FOR A MEDICARE WELLNESS VISIT WITH DR PINEDA ON 11/27/2023 AT 9:30AM. PATIENT STATED THAT SHE AND HER  USUALLY COME IN AT THE SAME TIME TO BE SEEN AND SHE IS ASKING IF HER APPOINTMENT COULD BE CHANGED. PLEASE ADVISE.

## 2023-10-03 ENCOUNTER — OFFICE VISIT (OUTPATIENT)
Dept: FAMILY MEDICINE CLINIC | Facility: CLINIC | Age: 68
End: 2023-10-03
Payer: MEDICARE

## 2023-10-03 VITALS
HEART RATE: 74 BPM | BODY MASS INDEX: 32.32 KG/M2 | OXYGEN SATURATION: 98 % | WEIGHT: 194 LBS | TEMPERATURE: 98.5 F | DIASTOLIC BLOOD PRESSURE: 74 MMHG | HEIGHT: 65 IN | SYSTOLIC BLOOD PRESSURE: 132 MMHG

## 2023-10-03 DIAGNOSIS — E78.2 MIXED HYPERLIPIDEMIA: ICD-10-CM

## 2023-10-03 DIAGNOSIS — R53.82 CHRONIC FATIGUE: Primary | ICD-10-CM

## 2023-10-03 DIAGNOSIS — I10 PRIMARY HYPERTENSION: ICD-10-CM

## 2023-10-03 PROCEDURE — 3075F SYST BP GE 130 - 139MM HG: CPT | Performed by: FAMILY MEDICINE

## 2023-10-03 PROCEDURE — 3078F DIAST BP <80 MM HG: CPT | Performed by: FAMILY MEDICINE

## 2023-10-03 PROCEDURE — 99214 OFFICE O/P EST MOD 30 MIN: CPT | Performed by: FAMILY MEDICINE

## 2023-10-03 NOTE — PROGRESS NOTES
C/o fatigue.      Subjective   Sweta Ceja is a 68 y.o. female.     Dizziness  Associated symptoms include fatigue. Pertinent negatives include no abdominal pain, arthralgias, chest pain, myalgias, rash, sore throat or weakness.    C/o fatigue.  Noted more in morning after doing devotional.    F/U HTN.  No orthostasis.    The following portions of the patient's history were reviewed and updated as appropriate: allergies, current medications, past family history, past medical history, past social history, past surgical history and problem list.    Review of Systems   Constitutional:  Positive for fatigue. Negative for appetite change.   HENT:  Negative for nosebleeds and sore throat.    Eyes:  Negative for blurred vision and visual disturbance.   Respiratory:  Negative for shortness of breath and wheezing.    Cardiovascular:  Negative for chest pain and leg swelling.   Gastrointestinal:  Negative for abdominal distention and abdominal pain.   Endocrine: Negative for cold intolerance and polyuria.   Genitourinary:  Negative for dysuria and hematuria.   Musculoskeletal:  Negative for arthralgias and myalgias.   Skin:  Negative for color change and rash.   Neurological:  Positive for dizziness. Negative for weakness and confusion.   Psychiatric/Behavioral:  Negative for agitation and depressed mood.      Patient Active Problem List   Diagnosis    Gastro-esophageal reflux disease without esophagitis    Hyperlipidemia    Insomnia    Obesity    Vitamin D deficiency    Medicare annual wellness visit, subsequent    Chronic fatigue    Cough    Burn    Immunization deficiency    Colon cancer screening    Encounter for hepatitis C screening test for low risk patient    Hand pain    Difficulty with speech    Postural dizziness with presyncope    Change in mental status    Primary hypertension       Allergies   Allergen Reactions    Lodine [Etodolac]          Current Outpatient Medications:     amLODIPine (NORVASC) 5 MG tablet,  Take 1 tablet by mouth Daily., Disp: 90 tablet, Rfl: 1    B Complex Vitamins (vitamin b complex) capsule capsule, Take 1 capsule by mouth Daily., Disp: , Rfl:     calcium carbonate (OS-JOSIE) 600 MG tablet, Take 1 tablet by mouth Daily., Disp: , Rfl:     Cholecalciferol (VITAMIN D) 2000 units tablet, Take 1 tablet by mouth Daily., Disp: , Rfl:     magnesium oxide (MAGOX) 400 (241.3 Mg) MG tablet tablet, Take 1 tablet by mouth Daily., Disp: , Rfl:     Multiple Minerals (Calcium-Magnesium-Zinc) tablet, , Disp: , Rfl:     omeprazole (priLOSEC) 40 MG capsule, TAKE 1 CAPSULE DAILY, Disp: 90 capsule, Rfl: 3    Past Medical History:   Diagnosis Date    Chest pain     Chronic lung disease     COPD (chronic obstructive pulmonary disease)     GERD (gastroesophageal reflux disease)     Hyperlipidemia     Postmenopausal     SVT (supraventricular tachycardia)        Past Surgical History:   Procedure Laterality Date    ABLATION OF DYSRHYTHMIC FOCUS      CARDIAC CATHETERIZATION       SECTION      CHOLECYSTECTOMY      COLONOSCOPY N/A 3/27/2023    Procedure: COLONOSCOPY FOR SCREENING;  Surgeon: Celestino Meza MD;  [place] [facility] [place]  Service: Gastroenterology;  Laterality: N/A;  diverticulosis, polyps, hemorrhoids    HYSTERECTOMY      KNEE ARTHROSCOPY Bilateral     meniscus repair       Family History   Problem Relation Age of Onset    Atrial fibrillation Mother     Cerebral aneurysm Mother     Transient ischemic attack Mother     Hypertension Mother     Breast cancer Mother     Arthritis Mother     Diabetes type I Father     Kidney disease Father        Social History     Tobacco Use    Smoking status: Former     Packs/day: 0.50     Years: 15.00     Pack years: 7.50     Types: Cigarettes     Start date: 1971     Quit date: 2001     Years since quittin.7    Smokeless tobacco: Never   Substance Use Topics    Alcohol use: Not Currently     Comment: socially,rare            Objective     Vitals:     10/03/23 1339   BP: 132/74   Pulse: 74   Temp: 98.5 °F (36.9 °C)   SpO2: 98%     Body mass index is 32.28 kg/m².    Physical Exam  Vitals reviewed.   Constitutional:       Appearance: She is well-developed. She is not diaphoretic.   HENT:      Head: Normocephalic and atraumatic.   Eyes:      General: No scleral icterus.     Pupils: Pupils are equal, round, and reactive to light.   Neck:      Thyroid: No thyromegaly.   Cardiovascular:      Rate and Rhythm: Normal rate and regular rhythm.      Heart sounds: No murmur heard.    No friction rub. No gallop.   Pulmonary:      Effort: Pulmonary effort is normal. No respiratory distress.      Breath sounds: No wheezing or rales.   Chest:      Chest wall: No tenderness.   Abdominal:      General: Bowel sounds are normal. There is no distension.      Palpations: Abdomen is soft.      Tenderness: There is no abdominal tenderness.   Musculoskeletal:         General: No deformity. Normal range of motion.   Lymphadenopathy:      Cervical: No cervical adenopathy.   Skin:     General: Skin is warm and dry.      Findings: No rash.   Neurological:      Cranial Nerves: No cranial nerve deficit.      Motor: No abnormal muscle tone.       Lab Results   Component Value Date    GLUCOSE 97 05/30/2023    BUN 15 05/30/2023    CREATININE 0.84 05/30/2023    EGFRIFNONA 78 10/04/2021    EGFRIFAFRI 95 10/04/2021    BCR 17.9 05/30/2023    K 3.9 05/30/2023    CO2 28.0 05/30/2023    CALCIUM 8.8 05/30/2023    PROTENTOTREF 7.0 11/23/2022    ALBUMIN 4.2 05/30/2023    LABIL2 1.9 11/23/2022    AST 15 05/30/2023    ALT 15 05/30/2023       WBC   Date Value Ref Range Status   05/30/2023 7.75 3.40 - 10.80 10*3/mm3 Final   11/23/2022 6.63 3.40 - 10.80 10*3/mm3 Final     RBC   Date Value Ref Range Status   05/30/2023 4.66 3.77 - 5.28 10*6/mm3 Final   11/23/2022 4.59 3.77 - 5.28 10*6/mm3 Final     Hemoglobin   Date Value Ref Range Status   05/30/2023 13.9 12.0 - 15.9 g/dL Final     Hematocrit   Date Value Ref  Range Status   05/30/2023 40.3 34.0 - 46.6 % Final     MCV   Date Value Ref Range Status   05/30/2023 86.5 79.0 - 97.0 fL Final     MCH   Date Value Ref Range Status   05/30/2023 29.8 26.6 - 33.0 pg Final     MCHC   Date Value Ref Range Status   05/30/2023 34.5 31.5 - 35.7 g/dL Final     RDW   Date Value Ref Range Status   05/30/2023 12.6 12.3 - 15.4 % Final     RDW-SD   Date Value Ref Range Status   05/30/2023 40.0 37.0 - 54.0 fl Final     MPV   Date Value Ref Range Status   05/30/2023 8.8 6.0 - 12.0 fL Final     Platelets   Date Value Ref Range Status   05/30/2023 256 140 - 450 10*3/mm3 Final     Neutrophil %   Date Value Ref Range Status   05/30/2023 56.9 42.7 - 76.0 % Final     Lymphocyte %   Date Value Ref Range Status   05/30/2023 29.9 19.6 - 45.3 % Final     Monocyte %   Date Value Ref Range Status   05/30/2023 9.7 5.0 - 12.0 % Final     Eosinophil %   Date Value Ref Range Status   05/30/2023 1.9 0.3 - 6.2 % Final     Basophil %   Date Value Ref Range Status   05/30/2023 0.8 0.0 - 1.5 % Final     Immature Grans %   Date Value Ref Range Status   05/30/2023 0.8 (H) 0.0 - 0.5 % Final     Neutrophils, Absolute   Date Value Ref Range Status   05/30/2023 4.41 1.70 - 7.00 10*3/mm3 Final     Lymphocytes, Absolute   Date Value Ref Range Status   05/30/2023 2.32 0.70 - 3.10 10*3/mm3 Final     Monocytes, Absolute   Date Value Ref Range Status   05/30/2023 0.75 0.10 - 0.90 10*3/mm3 Final     Eosinophils, Absolute   Date Value Ref Range Status   05/30/2023 0.15 0.00 - 0.40 10*3/mm3 Final     Basophils, Absolute   Date Value Ref Range Status   05/30/2023 0.06 0.00 - 0.20 10*3/mm3 Final     Immature Grans, Absolute   Date Value Ref Range Status   05/30/2023 0.06 (H) 0.00 - 0.05 10*3/mm3 Final     nRBC   Date Value Ref Range Status   05/30/2023 0.0 0.0 - 0.2 /100 WBC Final       No results found for: HGBA1C    Lab Results   Component Value Date    ZOYESYJT52 1,323 (H) 10/04/2021       TSH   Date Value Ref Range Status    10/04/2021 0.776 0.270 - 4.200 uIU/mL Final       No results found for: CHOL  Lab Results   Component Value Date    TRIG 167 (H) 11/23/2022     Lab Results   Component Value Date    HDL 62 (H) 11/23/2022     Lab Results   Component Value Date     (H) 11/23/2022     Lab Results   Component Value Date    VLDL 29 11/23/2022     No results found for: LDLHDL      Procedures    Assessment & Plan   Problems Addressed this Visit       Hyperlipidemia    Relevant Orders    Comprehensive Metabolic Panel    Lipid Panel With / Chol / HDL Ratio    Chronic fatigue - Primary    Relevant Orders    CBC & Differential    TSH Rfx On Abnormal To Free T4    Vitamin B12    Primary hypertension     Diagnoses         Codes Comments    Chronic fatigue    -  Primary ICD-10-CM: R53.82  ICD-9-CM: 780.79     Mixed hyperlipidemia     ICD-10-CM: E78.2  ICD-9-CM: 272.2     Primary hypertension     ICD-10-CM: I10  ICD-9-CM: 401.9           Chronic fatigue.  Uncontrolled, Check CBC, CMP, TSH, B12.    Hyperlipidmeia.  Check FLP.  Continue TLC.   Orders Placed This Encounter   Procedures    Comprehensive Metabolic Panel     Order Specific Question:   Release to patient     Answer:   Routine Release [[id number]]    Lipid Panel With / Chol / HDL Ratio     Order Specific Question:   Release to patient     Answer:   Routine Release [[id number]]    TSH Rfx On Abnormal To Free T4     Order Specific Question:   Release to patient     Answer:   Routine Release [[id number]]    Vitamin B12     Order Specific Question:   Release to patient     Answer:   Routine Release [[id number]]    CBC & Differential     Order Specific Question:   Manual Differential     Answer:   No     Order Specific Question:   Release to patient     Answer:   Routine Release [[id number]]       Current Outpatient Medications   Medication Sig Dispense Refill    amLODIPine (NORVASC) 5 MG tablet Take 1 tablet by mouth Daily. 90 tablet 1    B Complex Vitamins (vitamin b complex)  capsule capsule Take 1 capsule by mouth Daily.      calcium carbonate (OS-JOSIE) 600 MG tablet Take 1 tablet by mouth Daily.      Cholecalciferol (VITAMIN D) 2000 units tablet Take 1 tablet by mouth Daily.      magnesium oxide (MAGOX) 400 (241.3 Mg) MG tablet tablet Take 1 tablet by mouth Daily.      Multiple Minerals (Calcium-Magnesium-Zinc) tablet       omeprazole (priLOSEC) 40 MG capsule TAKE 1 CAPSULE DAILY 90 capsule 3     No current facility-administered medications for this visit.       Sweta NEVAREZJing Ceja had no medications administered during this visit.    No follow-ups on file.    There are no Patient Instructions on file for this visit.

## 2023-10-04 LAB
ALBUMIN SERPL-MCNC: 4.6 G/DL (ref 3.9–4.9)
ALBUMIN/GLOB SERPL: 1.8 {RATIO} (ref 1.2–2.2)
ALP SERPL-CCNC: 66 IU/L (ref 44–121)
ALT SERPL-CCNC: 23 IU/L (ref 0–32)
AST SERPL-CCNC: 21 IU/L (ref 0–40)
BASOPHILS # BLD AUTO: 0.1 X10E3/UL (ref 0–0.2)
BASOPHILS NFR BLD AUTO: 1 %
BILIRUB SERPL-MCNC: 0.3 MG/DL (ref 0–1.2)
BUN SERPL-MCNC: 16 MG/DL (ref 8–27)
BUN/CREAT SERPL: 19 (ref 12–28)
CALCIUM SERPL-MCNC: 9.6 MG/DL (ref 8.7–10.3)
CHLORIDE SERPL-SCNC: 99 MMOL/L (ref 96–106)
CHOLEST SERPL-MCNC: 213 MG/DL (ref 100–199)
CHOLEST/HDLC SERPL: 3.9 RATIO (ref 0–4.4)
CO2 SERPL-SCNC: 24 MMOL/L (ref 20–29)
CREAT SERPL-MCNC: 0.86 MG/DL (ref 0.57–1)
EGFRCR SERPLBLD CKD-EPI 2021: 73 ML/MIN/1.73
EOSINOPHIL # BLD AUTO: 0.1 X10E3/UL (ref 0–0.4)
EOSINOPHIL NFR BLD AUTO: 2 %
ERYTHROCYTE [DISTWIDTH] IN BLOOD BY AUTOMATED COUNT: 12.6 % (ref 11.7–15.4)
GLOBULIN SER CALC-MCNC: 2.5 G/DL (ref 1.5–4.5)
GLUCOSE SERPL-MCNC: 88 MG/DL (ref 70–99)
HCT VFR BLD AUTO: 40.8 % (ref 34–46.6)
HDLC SERPL-MCNC: 54 MG/DL
HGB BLD-MCNC: 13.4 G/DL (ref 11.1–15.9)
IMM GRANULOCYTES # BLD AUTO: 0.1 X10E3/UL (ref 0–0.1)
IMM GRANULOCYTES NFR BLD AUTO: 1 %
LDLC SERPL CALC-MCNC: 130 MG/DL (ref 0–99)
LYMPHOCYTES # BLD AUTO: 2.6 X10E3/UL (ref 0.7–3.1)
LYMPHOCYTES NFR BLD AUTO: 33 %
MCH RBC QN AUTO: 29.4 PG (ref 26.6–33)
MCHC RBC AUTO-ENTMCNC: 32.8 G/DL (ref 31.5–35.7)
MCV RBC AUTO: 90 FL (ref 79–97)
MONOCYTES # BLD AUTO: 0.7 X10E3/UL (ref 0.1–0.9)
MONOCYTES NFR BLD AUTO: 9 %
NEUTROPHILS # BLD AUTO: 4.3 X10E3/UL (ref 1.4–7)
NEUTROPHILS NFR BLD AUTO: 54 %
PLATELET # BLD AUTO: 313 X10E3/UL (ref 150–450)
POTASSIUM SERPL-SCNC: 4.5 MMOL/L (ref 3.5–5.2)
PROT SERPL-MCNC: 7.1 G/DL (ref 6–8.5)
RBC # BLD AUTO: 4.56 X10E6/UL (ref 3.77–5.28)
SODIUM SERPL-SCNC: 139 MMOL/L (ref 134–144)
TRIGL SERPL-MCNC: 161 MG/DL (ref 0–149)
TSH SERPL DL<=0.005 MIU/L-ACNC: 1.21 UIU/ML (ref 0.45–4.5)
VIT B12 SERPL-MCNC: 707 PG/ML (ref 232–1245)
VLDLC SERPL CALC-MCNC: 29 MG/DL (ref 5–40)
WBC # BLD AUTO: 7.8 X10E3/UL (ref 3.4–10.8)

## 2023-10-11 DIAGNOSIS — K21.9 GASTRO-ESOPHAGEAL REFLUX DISEASE WITHOUT ESOPHAGITIS: ICD-10-CM

## 2023-10-11 RX ORDER — OMEPRAZOLE 40 MG/1
CAPSULE, DELAYED RELEASE ORAL
Qty: 90 CAPSULE | Refills: 3 | Status: SHIPPED | OUTPATIENT
Start: 2023-10-11

## 2023-10-13 ENCOUNTER — HOSPITAL ENCOUNTER (OUTPATIENT)
Facility: HOSPITAL | Age: 68
Discharge: HOME | End: 2023-10-13
Admitting: NURSE PRACTITIONER
Payer: MEDICARE

## 2023-10-13 ENCOUNTER — OFFICE VISIT (OUTPATIENT)
Dept: OBSTETRICS AND GYNECOLOGY | Age: 68
End: 2023-10-13
Payer: MEDICARE

## 2023-10-13 VITALS
BODY MASS INDEX: 31.65 KG/M2 | HEIGHT: 65 IN | DIASTOLIC BLOOD PRESSURE: 74 MMHG | SYSTOLIC BLOOD PRESSURE: 126 MMHG | WEIGHT: 190 LBS

## 2023-10-13 DIAGNOSIS — Z90.710 S/P HYSTERECTOMY: ICD-10-CM

## 2023-10-13 DIAGNOSIS — Z12.31 VISIT FOR SCREENING MAMMOGRAM: ICD-10-CM

## 2023-10-13 DIAGNOSIS — Z01.419 WELL WOMAN EXAM WITH ROUTINE GYNECOLOGICAL EXAM: Primary | ICD-10-CM

## 2023-10-13 DIAGNOSIS — Z12.31 ENCOUNTER FOR SCREENING MAMMOGRAM FOR MALIGNANT NEOPLASM OF BREAST: ICD-10-CM

## 2023-10-13 PROCEDURE — 77067 SCR MAMMO BI INCL CAD: CPT

## 2023-10-13 PROCEDURE — 77063 BREAST TOMOSYNTHESIS BI: CPT

## 2023-10-13 NOTE — PROGRESS NOTES
Subjective     Chief Complaint   Patient presents with    Gynecologic Exam     New Gyn, ZULLY, mg , dexa 2020, csy 2023  CC:  no problems       History of Present Illness    Sweta Ceja is a 68 y.o.  who presents for annual exam.    New GYN  Here for annual exam  She is s/p hysterectomy - , had done for heavy bleeding/fibroids  [facility]  she has still been getting pap smears every 3 years. [facility]. Discussed screening guidelines.  Mammogram scheduled for today   Has 10 grandkids, 2 great grandchildren   Mother had breast cancer at 71  No other GYN concerns or complaints today    Obstetric History:  OB History          4    Para   4    Term   4            AB        Living   4         SAB        IAB        Ectopic        Molar        Multiple        Live Births   4               Menstrual History:     No LMP recorded. Patient has had a hysterectomy.         Current contraception: status post hysterectomy  History of abnormal Pap smear: no  Received Gardasil immunization: no  Perform regular self breast exam: yes - regularly  Family history of uterine or ovarian cancer: no  Family History of colon cancer: no  Family history of breast cancer: no    Mammogram: done today.  Colonoscopy: up to date.  DEXA: up to date.    Exercise: moderately active  Calcium/Vitamin D: uses supplements    The following portions of the patient's history were reviewed and updated as appropriate: allergies, current medications, past family history, past medical history, past social history, past surgical history, and problem list.    Review of Systems   Constitutional: Negative.    Respiratory: Negative.     Cardiovascular: Negative.    Gastrointestinal: Negative.    Genitourinary: Negative.    Skin: Negative.    Psychiatric/Behavioral: Negative.             Objective   Physical Exam  Constitutional:       General: She is awake.       Appearance: Normal appearance. She is well-developed.   HENT:      Head: Normocephalic and atraumatic.      Nose: Nose normal.   Neck:      Thyroid: No thyroid mass, thyromegaly or thyroid tenderness.   Cardiovascular:      Rate and Rhythm: Normal rate and regular rhythm.      Pulses: Normal pulses.      Heart sounds: Normal heart sounds.   Pulmonary:      Effort: Pulmonary effort is normal.      Breath sounds: Normal breath sounds.   Chest:   Breasts:     Breasts are symmetrical.      Right: Normal. No swelling, bleeding, inverted nipple, mass, nipple discharge, skin change or tenderness.      Left: Normal. No swelling, bleeding, inverted nipple, mass, nipple discharge, skin change or tenderness.   Abdominal:      General: Abdomen is flat. Bowel sounds are normal.      Palpations: Abdomen is soft.      Tenderness: There is no abdominal tenderness.   Genitourinary:     General: Normal vulva.      Labia:         Right: No rash, tenderness, lesion or injury.         Left: No rash, tenderness, lesion or injury.       Urethra: No prolapse, urethral pain, urethral swelling or urethral lesion.      Vagina: Normal. No signs of injury. No vaginal discharge, erythema, tenderness, bleeding, lesions or prolapsed vaginal walls.      Adnexa: Right adnexa normal and left adnexa normal.        Right: No mass, tenderness or fullness.          Left: No mass, tenderness or fullness.        Rectum: Normal. No mass.      Comments: Uterus and cervix absent  Musculoskeletal:      Cervical back: Normal range of motion and neck supple.   Lymphadenopathy:      Upper Body:      Right upper body: No supraclavicular adenopathy.      Left upper body: No supraclavicular adenopathy.   Skin:     General: Skin is warm and dry.   Neurological:      General: No focal deficit present.      Mental Status: She is alert and oriented to person, place, and time.   Psychiatric:         Mood and Affect: Mood normal.         Behavior: Behavior normal.  "Behavior is cooperative.         Thought Content: Thought content normal.         Judgment: Judgment normal.         /74   Ht 165.1 cm (65\")   Wt 86.2 kg (190 lb)   BMI 31.62 kg/mý     Assessment & Plan   Diagnoses and all orders for this visit:    1. Well woman exam with routine gynecological exam (Primary)    2. Encounter for screening mammogram for malignant neoplasm of breast    3. S/P hysterectomy        All questions answered.  Breast self exam technique reviewed and patient encouraged to perform self-exam monthly.  Discussed healthy lifestyle modifications.  Recommended 30 minutes of aerobic exercise five times per week.  Discussed calcium needs to prevent osteoporosis.    -Pap not indicated  -Mammogram today  -F/u 2 years, yearly for mammograms               "

## 2023-11-27 ENCOUNTER — OFFICE VISIT (OUTPATIENT)
Dept: FAMILY MEDICINE CLINIC | Facility: CLINIC | Age: 68
End: 2023-11-27
Payer: MEDICARE

## 2023-11-27 VITALS
WEIGHT: 193 LBS | OXYGEN SATURATION: 96 % | SYSTOLIC BLOOD PRESSURE: 114 MMHG | TEMPERATURE: 96.6 F | HEART RATE: 81 BPM | BODY MASS INDEX: 32.12 KG/M2 | DIASTOLIC BLOOD PRESSURE: 68 MMHG

## 2023-11-27 DIAGNOSIS — Z00.00 MEDICARE ANNUAL WELLNESS VISIT, SUBSEQUENT: Primary | ICD-10-CM

## 2023-11-27 DIAGNOSIS — I10 PRIMARY HYPERTENSION: ICD-10-CM

## 2023-11-27 PROCEDURE — 3074F SYST BP LT 130 MM HG: CPT | Performed by: FAMILY MEDICINE

## 2023-11-27 PROCEDURE — 1170F FXNL STATUS ASSESSED: CPT | Performed by: FAMILY MEDICINE

## 2023-11-27 PROCEDURE — 3078F DIAST BP <80 MM HG: CPT | Performed by: FAMILY MEDICINE

## 2023-11-27 PROCEDURE — G0439 PPPS, SUBSEQ VISIT: HCPCS | Performed by: FAMILY MEDICINE

## 2023-11-27 RX ORDER — AMLODIPINE BESYLATE 5 MG/1
5 TABLET ORAL DAILY
Qty: 90 TABLET | Refills: 4 | Status: SHIPPED | OUTPATIENT
Start: 2023-11-27

## 2023-11-27 NOTE — PROGRESS NOTES
The ABCs of the Annual Wellness Visit  Subsequent Medicare Wellness Visit    Subjective      Sweta Ceja is a 68 y.o. female who presents for a Subsequent Medicare Wellness Visit.    The following portions of the patient's history were reviewed and   updated as appropriate: allergies, current medications, past family history, past medical history, past social history, past surgical history, and problem list.    Compared to one year ago, the patient feels her physical   health is the same.    Compared to one year ago, the patient feels her mental   health is the same.    Recent Hospitalizations:  She was not admitted to the hospital during the last year.       Current Medical Providers:  Patient Care Team:  Georges Brooke MD as PCP - General (Family Medicine)    Outpatient Medications Prior to Visit   Medication Sig Dispense Refill    B Complex Vitamins (vitamin b complex) capsule capsule Take 1 capsule by mouth Daily.      calcium carbonate (OS-JOSIE) 600 MG tablet Take 1 tablet by mouth Daily.      Cholecalciferol (VITAMIN D) 2000 units tablet Take 1 tablet by mouth Daily.      Multiple Minerals (Calcium-Magnesium-Zinc) tablet       omeprazole (priLOSEC) 40 MG capsule TAKE 1 CAPSULE DAILY 90 capsule 3    amLODIPine (NORVASC) 5 MG tablet Take 1 tablet by mouth Daily. 90 tablet 1     No facility-administered medications prior to visit.       No opioid medication identified on active medication list. I have reviewed chart for other potential  high risk medication/s and harmful drug interactions in the elderly.        Aspirin is not on active medication list.  Aspirin use is not indicated based on review of current medical condition/s. Risk of harm outweighs potential benefits.  .    Patient Active Problem List   Diagnosis    Gastro-esophageal reflux disease without esophagitis    Hyperlipidemia    Insomnia    Obesity    Vitamin D deficiency    Medicare annual wellness visit, subsequent    Chronic fatigue    Cough  "   Burn    Immunization deficiency    Colon cancer screening    Encounter for hepatitis C screening test for low risk patient    Hand pain    Difficulty with speech    Postural dizziness with presyncope    Change in mental status    Primary hypertension     Advance Care Planning   Advance Care Planning     Advance Directive is not on file.  ACP discussion was held with the patient during this visit. Patient has an advance directive (not in EMR), copy requested.     Objective    Vitals:    23 0821   BP: 114/68   BP Location: Left arm   Patient Position: Sitting   Cuff Size: Large Adult   Pulse: 81   Temp: 96.6 °F (35.9 °C)   SpO2: 96%   Weight: 87.5 kg (193 lb)     Estimated body mass index is 32.12 kg/m² as calculated from the following:    Height as of 10/13/23: 165.1 cm (65\").    Weight as of this encounter: 87.5 kg (193 lb).           Does the patient have evidence of cognitive impairment?   No    Lab Results   Component Value Date    CHLPL 213 (H) 10/03/2023    TRIG 161 (H) 10/03/2023    HDL 54 10/03/2023     (H) 10/03/2023    VLDL 29 10/03/2023          HEALTH RISK ASSESSMENT    Smoking Status:  Social History     Tobacco Use   Smoking Status Former    Packs/day: 0.50    Years: 15.00    Additional pack years: 0.00    Total pack years: 7.50    Types: Cigarettes    Start date: 1971    Quit date: 2001    Years since quittin.9   Smokeless Tobacco Never     Alcohol Consumption:  Social History     Substance and Sexual Activity   Alcohol Use Not Currently    Comment: socially,rare     Fall Risk Screen:    STEADI Fall Risk Assessment was completed, and patient is at LOW risk for falls.Assessment completed on:2023    Depression Screenin/27/2023     8:25 AM   PHQ-2/PHQ-9 Depression Screening   Little Interest or Pleasure in Doing Things 0-->not at all   Feeling Down, Depressed or Hopeless 0-->not at all   PHQ-9: Brief Depression Severity Measure Score 0       Health Habits and " Functional and Cognitive Screenin/27/2023     8:24 AM   Functional & Cognitive Status   Do you have difficulty preparing food and eating? No   Do you have difficulty bathing yourself, getting dressed or grooming yourself? No   Do you have difficulty using the toilet? No   Do you have difficulty moving around from place to place? No   Do you have trouble with steps or getting out of a bed or a chair? No   Current Diet Unhealthy Diet   Dental Exam Up to date   Eye Exam Up to date   Exercise (times per week) 5 times per week   Current Exercises Include Walking   Do you need help using the phone?  No   Are you deaf or do you have serious difficulty hearing?  No   Do you need help to go to places out of walking distance? No   Do you need help shopping? No   Do you need help preparing meals?  No   Do you need help with housework?  No   Do you need help with laundry? No   Do you need help taking your medications? No   Do you need help managing money? No   Do you ever drive or ride in a car without wearing a seat belt? No   Have you felt unusual stress, anger or loneliness in the last month? No   Who do you live with? Spouse   If you need help, do you have trouble finding someone available to you? No   Have you been bothered in the last four weeks by sexual problems? No   Do you have difficulty concentrating, remembering or making decisions? No       Age-appropriate Screening Schedule:  Refer to the list below for future screening recommendations based on patient's age, sex and/or medical conditions. Orders for these recommended tests are listed in the plan section. The patient has been provided with a written plan.    Health Maintenance   Topic Date Due    DXA SCAN  Never done    TDAP/TD VACCINES (2 - Tdap) 2010    ZOSTER VACCINE (2 of 2) 2018    MAMMOGRAM  2023    LIPID PANEL  10/03/2024    ANNUAL WELLNESS VISIT  2024    BMI FOLLOWUP  2024    COLORECTAL CANCER SCREENING  2028     HEPATITIS C SCREENING  Completed    COVID-19 Vaccine  Completed    INFLUENZA VACCINE  Completed    Pneumococcal Vaccine 65+  Completed                  CMS Preventative Services Quick Reference  Risk Factors Identified During Encounter:    Inactivity/Sedentary: Patient was advised to exercise at least 150 minutes a week per CDC recommendations.    The above risks/problems have been discussed with the patient.  Pertinent information has been shared with the patient in the After Visit Summary.    Diagnoses and all orders for this visit:    1. Medicare annual wellness visit, subsequent (Primary)    2. Primary hypertension  -     amLODIPine (NORVASC) 5 MG tablet; Take 1 tablet by mouth Daily.  Dispense: 90 tablet; Refill: 4      Preventive Counseling:  Encouraged to stay active.  Covid vaccine/flu utd.  HEP A UTD.  Pneumovax UTD.  Colonoscopy UTD.    Dentist UTD.  Optho UTD.      Follow Up:   Next Medicare Wellness visit to be scheduled in 1 year.      An After Visit Summary and PPPS were made available to the patient.

## 2024-01-30 DIAGNOSIS — I10 PRIMARY HYPERTENSION: ICD-10-CM

## 2024-01-30 RX ORDER — AMLODIPINE BESYLATE 5 MG/1
5 TABLET ORAL DAILY
Qty: 90 TABLET | Refills: 4 | Status: SHIPPED | OUTPATIENT
Start: 2024-01-30

## 2024-02-19 ENCOUNTER — TELEPHONE (OUTPATIENT)
Dept: FAMILY MEDICINE CLINIC | Facility: CLINIC | Age: 69
End: 2024-02-19

## 2024-02-19 DIAGNOSIS — R73.9 HYPERGLYCEMIA: Primary | ICD-10-CM

## 2024-02-19 NOTE — TELEPHONE ENCOUNTER
Caller: Sweta Ceja    Relationship to patient: Self    Best call back number: [phone]         Type of visit:NEEDS TO SCHEDULE A LAB APPOINTMENT TO DO HER AIC

## 2024-05-20 ENCOUNTER — OFFICE VISIT (OUTPATIENT)
Dept: FAMILY MEDICINE CLINIC | Facility: CLINIC | Age: 69
End: 2024-05-20
Payer: MEDICARE

## 2024-05-20 VITALS
TEMPERATURE: 97.7 F | DIASTOLIC BLOOD PRESSURE: 78 MMHG | WEIGHT: 190.6 LBS | SYSTOLIC BLOOD PRESSURE: 124 MMHG | HEART RATE: 82 BPM | BODY MASS INDEX: 31.75 KG/M2 | OXYGEN SATURATION: 97 % | HEIGHT: 65 IN | RESPIRATION RATE: 17 BRPM

## 2024-05-20 DIAGNOSIS — I10 PRIMARY HYPERTENSION: ICD-10-CM

## 2024-05-20 DIAGNOSIS — G47.62 NOCTURNAL LEG CRAMPS: ICD-10-CM

## 2024-05-20 DIAGNOSIS — M17.11 PRIMARY OSTEOARTHRITIS OF RIGHT KNEE: Primary | ICD-10-CM

## 2024-05-20 PROCEDURE — G2211 COMPLEX E/M VISIT ADD ON: HCPCS | Performed by: FAMILY MEDICINE

## 2024-05-20 PROCEDURE — 1125F AMNT PAIN NOTED PAIN PRSNT: CPT | Performed by: FAMILY MEDICINE

## 2024-05-20 PROCEDURE — 3074F SYST BP LT 130 MM HG: CPT | Performed by: FAMILY MEDICINE

## 2024-05-20 PROCEDURE — 3078F DIAST BP <80 MM HG: CPT | Performed by: FAMILY MEDICINE

## 2024-05-20 PROCEDURE — 99214 OFFICE O/P EST MOD 30 MIN: CPT | Performed by: FAMILY MEDICINE

## 2024-05-20 RX ORDER — CELECOXIB 200 MG/1
200 CAPSULE ORAL DAILY
Qty: 30 CAPSULE | Refills: 5 | Status: SHIPPED | OUTPATIENT
Start: 2024-05-20

## 2024-05-20 RX ORDER — GLUCOSAMINE/CHONDROITIN/C/MANG 500-400 MG
CAPSULE ORAL
COMMUNITY

## 2024-05-20 NOTE — PROGRESS NOTES
Chief Complaint   Patient presents with    Hypertension    Leg Pain     R knee       Subjective   Sweta Ceja is a 69 y.o. female.     Hypertension  Pertinent negatives include no blurred vision, chest pain or shortness of breath.   Leg Pain        F/u HTN.  No orthostasis.  Doing well with meds.    C/o B leg cramps.  Going on for 3 months.  More at night.    C/o R knee pain.  Dx with R knee oa.  No help with steroid injections.      The following portions of the patient's history were reviewed and updated as appropriate: allergies, current medications, past family history, past medical history, past social history, past surgical history and problem list.    Review of Systems   Constitutional:  Negative for appetite change and fatigue.   HENT:  Negative for nosebleeds and sore throat.    Eyes:  Negative for blurred vision and visual disturbance.   Respiratory:  Negative for shortness of breath and wheezing.    Cardiovascular:  Negative for chest pain and leg swelling.   Gastrointestinal:  Negative for abdominal distention and abdominal pain.   Endocrine: Negative for cold intolerance and polyuria.   Genitourinary:  Negative for dysuria and hematuria.   Musculoskeletal:  Positive for arthralgias and myalgias.   Skin:  Negative for color change and rash.   Neurological:  Negative for weakness and confusion.   Psychiatric/Behavioral:  Negative for agitation and depressed mood.        Patient Active Problem List   Diagnosis    Gastro-esophageal reflux disease without esophagitis    Hyperlipidemia    Insomnia    Obesity    Vitamin D deficiency    Medicare annual wellness visit, subsequent    Chronic fatigue    Cough    Burn    Immunization deficiency    Colon cancer screening    Encounter for hepatitis C screening test for low risk patient    Hand pain    Difficulty with speech    Postural dizziness with presyncope    Change in mental status    Primary hypertension    Hyperglycemia    Primary osteoarthritis of right  knee    Nocturnal leg cramps       Allergies   Allergen Reactions    Lodine [Etodolac]          Current Outpatient Medications:     amLODIPine (NORVASC) 5 MG tablet, Take 1 tablet by mouth Daily., Disp: 90 tablet, Rfl: 4    B Complex Vitamins (vitamin b complex) capsule capsule, Take 1 capsule by mouth Daily., Disp: , Rfl:     Cholecalciferol (VITAMIN D) 2000 units tablet, Take 1 tablet by mouth Daily., Disp: , Rfl:     Glucosamine-Chondroit-Vit C-Mn (Glucosamine-Chondroitin Max St) capsule, Take  by mouth., Disp: , Rfl:     Multiple Minerals (Calcium-Magnesium-Zinc) tablet, , Disp: , Rfl:     omeprazole (priLOSEC) 40 MG capsule, TAKE 1 CAPSULE DAILY, Disp: 90 capsule, Rfl: 3    calcium carbonate (OS-JOSIE) 600 MG tablet, Take 1 tablet by mouth Daily., Disp: , Rfl:     Past Medical History:   Diagnosis Date    Chest pain     Chronic lung disease     COPD (chronic obstructive pulmonary disease)     GERD (gastroesophageal reflux disease)     Hyperlipidemia     Postmenopausal     SVT (supraventricular tachycardia)        Past Surgical History:   Procedure Laterality Date    ABLATION OF DYSRHYTHMIC FOCUS      CARDIAC CATHETERIZATION       SECTION      CHOLECYSTECTOMY      COLONOSCOPY N/A 3/27/2023    Procedure: COLONOSCOPY FOR SCREENING;  Surgeon: Celestino Meza MD;  Location: [facility] [place];  Service: Gastroenterology;  Laterality: N/A;  diverticulosis, polyps, hemorrhoids    HYSTERECTOMY      KNEE ARTHROSCOPY Bilateral     meniscus repair       Family History   Problem Relation Age of Onset    Atrial fibrillation Mother     Cerebral aneurysm Mother     Transient ischemic attack Mother     Hypertension Mother     Breast cancer Mother     Arthritis Mother     Diabetes type I Father     Kidney disease Father        Social History     Tobacco Use    Smoking status: Former     Current packs/day: 0.00     Average packs/day: 0.5 packs/day for 29.9 years (15.0 ttl pk-yrs)     Types: Cigarettes     Start date:  1971     Quit date: 2001     Years since quittin.3    Smokeless tobacco: Never   Substance Use Topics    Alcohol use: Not Currently     Comment: socially,rare            Objective     Vitals:    24 0812   BP: 124/78   Pulse: 82   Resp: 17   Temp: 97.7 °F (36.5 °C)   SpO2: 97%     Body mass index is 31.68 kg/m².    Physical Exam  Vitals reviewed.   Constitutional:       Appearance: She is well-developed. She is not diaphoretic.   HENT:      Head: Normocephalic and atraumatic.   Eyes:      General: No scleral icterus.     Pupils: Pupils are equal, round, and reactive to light.   Neck:      Thyroid: No thyromegaly.   Cardiovascular:      Rate and Rhythm: Normal rate and regular rhythm.      Heart sounds: No murmur heard.     No friction rub. No gallop.   Pulmonary:      Effort: Pulmonary effort is normal. No respiratory distress.      Breath sounds: No wheezing or rales.   Chest:      Chest wall: No tenderness.   Abdominal:      General: Bowel sounds are normal. There is no distension.      Palpations: Abdomen is soft.      Tenderness: There is no abdominal tenderness.   Musculoskeletal:         General: No deformity. Normal range of motion.      Comments: R knee crepitus   Lymphadenopathy:      Cervical: No cervical adenopathy.   Skin:     General: Skin is warm and dry.      Findings: No rash.   Neurological:      Cranial Nerves: No cranial nerve deficit.      Motor: No abnormal muscle tone.         Lab Results   Component Value Date    GLUCOSE 88 10/03/2023    BUN 16 10/03/2023    CREATININE 0.86 10/03/2023    EGFRIFNONA 78 10/04/2021    EGFRIFAFRI 95 10/04/2021    BCR 19 10/03/2023    K 4.5 10/03/2023    CO2 24 10/03/2023    CALCIUM 9.6 10/03/2023    PROTENTOTREF 7.1 10/03/2023    ALBUMIN 4.6 10/03/2023    LABIL2 1.8 10/03/2023    AST 21 10/03/2023    ALT 23 10/03/2023       WBC   Date Value Ref Range Status   10/03/2023 7.8 3.4 - 10.8 x10E3/uL Final     RBC   Date Value Ref Range Status    10/03/2023 4.56 3.77 - 5.28 x10E6/uL Final     Hemoglobin   Date Value Ref Range Status   10/03/2023 13.4 11.1 - 15.9 g/dL Final   05/30/2023 13.9 12.0 - 15.9 g/dL Final     Hematocrit   Date Value Ref Range Status   10/03/2023 40.8 34.0 - 46.6 % Final   05/30/2023 40.3 34.0 - 46.6 % Final     MCV   Date Value Ref Range Status   10/03/2023 90 79 - 97 fL Final   05/30/2023 86.5 79.0 - 97.0 fL Final     MCH   Date Value Ref Range Status   10/03/2023 29.4 26.6 - 33.0 pg Final   05/30/2023 29.8 26.6 - 33.0 pg Final     MCHC   Date Value Ref Range Status   10/03/2023 32.8 31.5 - 35.7 g/dL Final   05/30/2023 34.5 31.5 - 35.7 g/dL Final     RDW   Date Value Ref Range Status   10/03/2023 12.6 11.7 - 15.4 % Final   05/30/2023 12.6 12.3 - 15.4 % Final     RDW-SD   Date Value Ref Range Status   05/30/2023 40.0 37.0 - 54.0 fl Final     MPV   Date Value Ref Range Status   05/30/2023 8.8 6.0 - 12.0 fL Final     Platelets   Date Value Ref Range Status   10/03/2023 313 150 - 450 x10E3/uL Final   05/30/2023 256 140 - 450 10*3/mm3 Final     Neutrophil Rel %   Date Value Ref Range Status   10/03/2023 54 Not Estab. % Final     Neutrophil %   Date Value Ref Range Status   05/30/2023 56.9 42.7 - 76.0 % Final     Lymphocyte Rel %   Date Value Ref Range Status   10/03/2023 33 Not Estab. % Final     Lymphocyte %   Date Value Ref Range Status   05/30/2023 29.9 19.6 - 45.3 % Final     Monocyte Rel %   Date Value Ref Range Status   10/03/2023 9 Not Estab. % Final     Monocyte %   Date Value Ref Range Status   05/30/2023 9.7 5.0 - 12.0 % Final     Eosinophil Rel %   Date Value Ref Range Status   10/03/2023 2 Not Estab. % Final     Eosinophil %   Date Value Ref Range Status   05/30/2023 1.9 0.3 - 6.2 % Final     Basophil Rel %   Date Value Ref Range Status   10/03/2023 1 Not Estab. % Final     Basophil %   Date Value Ref Range Status   05/30/2023 0.8 0.0 - 1.5 % Final     Immature Grans %   Date Value Ref Range Status   05/30/2023 0.8 (H) 0.0  "- 0.5 % Final     Neutrophils Absolute   Date Value Ref Range Status   10/03/2023 4.3 1.4 - 7.0 x10E3/uL Final     Neutrophils, Absolute   Date Value Ref Range Status   05/30/2023 4.41 1.70 - 7.00 10*3/mm3 Final     Lymphocytes Absolute   Date Value Ref Range Status   10/03/2023 2.6 0.7 - 3.1 x10E3/uL Final     Lymphocytes, Absolute   Date Value Ref Range Status   05/30/2023 2.32 0.70 - 3.10 10*3/mm3 Final     Monocytes Absolute   Date Value Ref Range Status   10/03/2023 0.7 0.1 - 0.9 x10E3/uL Final     Monocytes, Absolute   Date Value Ref Range Status   05/30/2023 0.75 0.10 - 0.90 10*3/mm3 Final     Eosinophils Absolute   Date Value Ref Range Status   10/03/2023 0.1 0.0 - 0.4 x10E3/uL Final     Eosinophils, Absolute   Date Value Ref Range Status   05/30/2023 0.15 0.00 - 0.40 10*3/mm3 Final     Basophils Absolute   Date Value Ref Range Status   10/03/2023 0.1 0.0 - 0.2 x10E3/uL Final     Basophils, Absolute   Date Value Ref Range Status   05/30/2023 0.06 0.00 - 0.20 10*3/mm3 Final     Immature Grans, Absolute   Date Value Ref Range Status   05/30/2023 0.06 (H) 0.00 - 0.05 10*3/mm3 Final     nRBC   Date Value Ref Range Status   05/30/2023 0.0 0.0 - 0.2 /100 WBC Final       Lab Results   Component Value Date    HGBA1C 5.90 (H) 02/20/2024       Lab Results   Component Value Date    EHGERQBZ18 707 10/03/2023       TSH   Date Value Ref Range Status   10/03/2023 1.210 0.450 - 4.500 uIU/mL Final       No results found for: \"CHOL\"  Lab Results   Component Value Date    TRIG 161 (H) 10/03/2023     Lab Results   Component Value Date    HDL 54 10/03/2023     Lab Results   Component Value Date     (H) 10/03/2023     Lab Results   Component Value Date    VLDL 29 10/03/2023     No results found for: \"LDLHDL\"      Procedures    Assessment & Plan   Problems Addressed this Visit       Primary hypertension    Primary osteoarthritis of right knee - Primary    Nocturnal leg cramps     Diagnoses         Codes Comments    Primary " osteoarthritis of right knee    -  Primary ICD-10-CM: M17.11  ICD-9-CM: 715.16     Primary hypertension     ICD-10-CM: I10  ICD-9-CM: 401.9     Nocturnal leg cramps     ICD-10-CM: G47.62  ICD-9-CM: 327.52         R knee OA.  No help with bracing, injections.  Start celebrex.    HTN. Controlle.d Continue meds.  Check CMP.   Nocturnal leg cramps.  New probmlem, chronic.  Check CMP.  Start magnesium daily.      No orders of the defined types were placed in this encounter.      Current Outpatient Medications   Medication Sig Dispense Refill    amLODIPine (NORVASC) 5 MG tablet Take 1 tablet by mouth Daily. 90 tablet 4    B Complex Vitamins (vitamin b complex) capsule capsule Take 1 capsule by mouth Daily.      Cholecalciferol (VITAMIN D) 2000 units tablet Take 1 tablet by mouth Daily.      Glucosamine-Chondroit-Vit C-Mn (Glucosamine-Chondroitin Max St) capsule Take  by mouth.      Multiple Minerals (Calcium-Magnesium-Zinc) tablet       omeprazole (priLOSEC) 40 MG capsule TAKE 1 CAPSULE DAILY 90 capsule 3    calcium carbonate (OS-JOSIE) 600 MG tablet Take 1 tablet by mouth Daily.       No current facility-administered medications for this visit.       Sweta Ceja had no medications administered during this visit.    No follow-ups on file.    There are no Patient Instructions on file for this visit.

## 2024-05-21 LAB
ALBUMIN SERPL-MCNC: 4.4 G/DL (ref 3.5–5.2)
ALBUMIN/GLOB SERPL: 2 G/DL
ALP SERPL-CCNC: 64 U/L (ref 39–117)
ALT SERPL-CCNC: 18 U/L (ref 1–33)
AST SERPL-CCNC: 17 U/L (ref 1–32)
BILIRUB SERPL-MCNC: 0.5 MG/DL (ref 0–1.2)
BUN SERPL-MCNC: 13 MG/DL (ref 8–23)
BUN/CREAT SERPL: 14.8 (ref 7–25)
CALCIUM SERPL-MCNC: 9.2 MG/DL (ref 8.6–10.5)
CHLORIDE SERPL-SCNC: 106 MMOL/L (ref 98–107)
CO2 SERPL-SCNC: 25.1 MMOL/L (ref 22–29)
CREAT SERPL-MCNC: 0.88 MG/DL (ref 0.57–1)
EGFRCR SERPLBLD CKD-EPI 2021: 70.4 ML/MIN/1.73
GLOBULIN SER CALC-MCNC: 2.2 GM/DL
GLUCOSE SERPL-MCNC: 98 MG/DL (ref 65–99)
POTASSIUM SERPL-SCNC: 4.8 MMOL/L (ref 3.5–5.2)
PROT SERPL-MCNC: 6.6 G/DL (ref 6–8.5)
SODIUM SERPL-SCNC: 142 MMOL/L (ref 136–145)

## 2024-09-26 ENCOUNTER — TELEPHONE (OUTPATIENT)
Dept: OBSTETRICS AND GYNECOLOGY | Age: 69
End: 2024-09-26

## 2024-09-26 DIAGNOSIS — Z12.31 SCREENING MAMMOGRAM FOR BREAST CANCER: Primary | ICD-10-CM

## 2024-09-26 NOTE — TELEPHONE ENCOUNTER
Caller: Sweta Ceja    Relationship to patient: Self    Best call back number: [phone] (home)       Patient is needing: NEEDING TO SCHEDULE YEARLY MAMMOGRAM

## 2024-10-10 ENCOUNTER — OFFICE VISIT (OUTPATIENT)
Age: 69
End: 2024-10-10
Payer: MEDICARE

## 2024-10-10 VITALS — BODY MASS INDEX: 31.65 KG/M2 | TEMPERATURE: 98 F | HEIGHT: 65 IN | WEIGHT: 190 LBS

## 2024-10-10 DIAGNOSIS — G89.29 CHRONIC PAIN OF RIGHT KNEE: Primary | ICD-10-CM

## 2024-10-10 DIAGNOSIS — M17.11 ARTHRITIS OF RIGHT KNEE: ICD-10-CM

## 2024-10-10 DIAGNOSIS — M25.561 CHRONIC PAIN OF RIGHT KNEE: Primary | ICD-10-CM

## 2024-10-10 RX ADMIN — METHYLPREDNISOLONE ACETATE 80 MG: 80 INJECTION, SUSPENSION INTRA-ARTICULAR; INTRALESIONAL; INTRAMUSCULAR; SOFT TISSUE at 13:48

## 2024-10-10 RX ADMIN — LIDOCAINE HYDROCHLORIDE 2 ML: 10 INJECTION, SOLUTION EPIDURAL; INFILTRATION; INTRACAUDAL; PERINEURAL at 13:48

## 2024-10-10 NOTE — PROGRESS NOTES
Patient: Sweta Ceja    YOB: 1955    Medical Record Number: [id number]    Chief Complaints:  Right knee pain    History of Present Illness:     69 y.o. female patient who presents for evaluation of right knee pain. She reports the symptoms first began years ago.  She was previously treated with cortisone and gel injections by Dr. Garcia.  Reports neither injection seemed to help.  Reports her las injection was in March 2024.  Current pain is described as moderate, constant and aching.  The pain is predominantly along the medial side of the knee.  Denies any clicking, popping or catching.  Symptoms are worse with activity and at night.  She takes Celebrex which helps somewhat.  She has been using a brace, but it is very uncomfortable for her. Denies any shooting pain down the legs, weakness, numbness or paresthesias.    Allergies:   Allergies   Allergen Reactions    Lodine [Etodolac] Hives       Home Medications    Current Outpatient Medications:     amLODIPine (NORVASC) 5 MG tablet, Take 1 tablet by mouth Daily., Disp: 90 tablet, Rfl: 4    B Complex Vitamins (vitamin b complex) capsule capsule, Take 1 capsule by mouth Daily., Disp: , Rfl:     calcium carbonate (OS-JOSIE) 600 MG tablet, Take 1 tablet by mouth Daily., Disp: , Rfl:     celecoxib (CeleBREX) 200 MG capsule, Take 1 capsule by mouth Daily., Disp: 30 capsule, Rfl: 5    Cholecalciferol (VITAMIN D) 2000 units tablet, Take 1 tablet by mouth Daily., Disp: , Rfl:     Glucosamine-Chondroit-Vit C-Mn (Glucosamine-Chondroitin Max St) capsule, Take  by mouth., Disp: , Rfl:     Multiple Minerals (Calcium-Magnesium-Zinc) tablet, , Disp: , Rfl:     omeprazole (priLOSEC) 40 MG capsule, TAKE 1 CAPSULE DAILY, Disp: 90 capsule, Rfl: 3    Past Medical History:   Diagnosis Date    Chest pain     Chronic lung disease     COPD (chronic obstructive pulmonary disease)     GERD (gastroesophageal reflux disease)     Hyperlipidemia     Postmenopausal     SVT  (supraventricular tachycardia)        Past Surgical History:   Procedure Laterality Date    ABLATION OF DYSRHYTHMIC FOCUS      CARDIAC CATHETERIZATION       SECTION      CHOLECYSTECTOMY      COLONOSCOPY N/A 3/27/2023    Procedure: COLONOSCOPY FOR SCREENING;  Surgeon: Celestino Meza MD;  Location: [facility] [place] [facility];  Service: Gastroenterology;  Laterality: N/A;  diverticulosis, polyps, hemorrhoids    HYSTERECTOMY      KNEE ARTHROSCOPY Bilateral     meniscus repair       Social History     Occupational History    Not on file   Tobacco Use    Smoking status: Former     Current packs/day: 0.00     Average packs/day: 0.5 packs/day for 29.9 years (15.0 ttl pk-yrs)     Types: Cigarettes     Start date: 1971     Quit date: 2001     Years since quittin.7    Smokeless tobacco: Never   Vaping Use    Vaping status: Never Used   Substance and Sexual Activity    Alcohol use: Not Currently     Comment: socially,rare    Drug use: Never    Sexual activity: Not Currently     Partners: Male      Social History     Social History Narrative    Not on file       Family History   Problem Relation Age of Onset    Atrial fibrillation Mother     Cerebral aneurysm Mother     Transient ischemic attack Mother     Hypertension Mother     Breast cancer Mother     Arthritis Mother     Diabetes type I Father     Kidney disease Father        Review of Systems:      Constitutional: Denies fever, shaking or chills   Eyes: Denies change in visual acuity   HEENT: Denies nasal congestion or sore throat   Respiratory: Denies cough or shortness of breath   Cardiovascular: Denies chest pain or edema  Endocrine: Denies tremors, palpitations, intolerance of heat or cold, polyuria, polydipsia.  GI: Denies abdominal pain, nausea, vomiting, bloody stools or diarrhea  : Denies frequency, urgency, incontinence, retention, or nocturia.  Musculoskeletal: Denies numbness, tingling or loss of motor function except as above  Integument: Denies  "rash, lesion or ulceration   Neurologic: Denies headache or focal weakness, deficits  Heme: Denies spontaneous or excessive bleeding, epistaxis, hematuria, melena, fatigue, enlarged or tender lymph nodes.      All other pertinent positives and negatives as noted above in HPI.    Physical Exam:   69 y.o. female  Vitals:    10/10/24 1320   Temp: 98 °F (36.7 °C)   Weight: 86.2 kg (190 lb)   Height: 165.2 cm (65.04\")     General:  Patient is awake and alert.  Appears in no acute distress or discomfort.    Psych:  Affect and demeanor are appropriate.    Eyes:  Conjunctiva and sclera appear grossly normal.  Eyes track well and EOM seem to be intact.    Ears:  No gross abnormalities.  Hearing adequate for the exam.    Cardiovascular:  Regular rate and rhythm.    Lungs:  Good chest expansion.  Breathing unlabored.    Spine:  Back appears grossly normal.  No palpable masses or adenopathy.  Good motion.  Straight leg raise and crossed straight leg raise maneuver are both negative for lower leg and/or knee pain.    Extremities:  Right knee is examined.  Skin is benign.  No obvious gross abnormalities.  No palpable masses or adenopathy.  Moderate tenderness noted over the anterior region and medial joint line.  Motion is: 3-115°.  Crepitus noted with passive range of motion.  No instability.  Positive patellar grind test.  Strength is well preserved including hip flexion, knee extension, ankle and toe plantarflexion, ankle inversion and eversion.  Good sensory function throughout the leg and foot.  Palpable pulses.  Brisk capillary refill.  Good skin turgor.         Radiology:   Bilateral standing AP views, bilateral merchants views and a lateral view of the right knee are ordered by myself and reviewed to evaluate the patient's complaint.  No comparison films are immediately available.  The x-rays show significant medial compartment degenerative arthritis including joint space narrowing, osteophyte formation, and subchondral " sclerosis.     Assessment/Plan:  1.  Chronic right knee pain  2.  Right knee osteoarthritis    We discussed treatment options in detail including the risks, benefits, and alternatives of conservative treatment versus surgical options.  Regarding conservative treatment, we discussed appropriate activity modifications, anti-inflammatories, injections (including both corticosteroids and visco supplementation), and physical therapy.  We also discussed the option of an arthroplasty and all that would entail.  I answered all questions to her satisfaction regarding surgery.  She is not ready to proceed with surgery at this time, but will give it some consideration.  Additionally, we discussed the benefits of attending physical therapy prior to surgery.  I have recommended that we start with a conservative approach and the patient agrees.    The patient has acknowledged understanding of the information and elected for an injection.  The risk, benefits and alternatives were discussed.  The patient consented and the injection was performed as described below.  I had her fitted for a more comfortable brace today.  Going forward, I recommended she follow up with Dr. Belle in 3 months for evaluation and detailed discussion of treatment options available.  She agreed with this plan.    SHASTA Nunes    10/10/2024    CC to Georges Brooke MD    Much of this encounter note is an electronic transcription/translation of spoken language to printed text. The electronic translation of spoken language may permit erroneous, or at times, nonsensical words or phrases to be inadvertently transcribed.  Although I have reviewed the note for such errors, some may still exist.      Large Joint Arthrocentesis: R knee  Date/Time: 10/10/2024 1:48 PM  Consent given by: patient  Site marked: site marked  Timeout: Immediately prior to procedure a time out was called to verify the correct patient, procedure, equipment, support staff and  site/side marked as required   Supporting Documentation  Indications: pain   Procedure Details  Location: knee - R knee  Preparation: Patient was prepped and draped in the usual sterile fashion  Needle gauge: 21g.  Approach: anterolateral  Medications administered: 80 mg methylPREDNISolone acetate 80 MG/ML; 2 mL lidocaine PF 1% 1 %  Patient tolerance: patient tolerated the procedure well with no immediate complications

## 2024-10-12 RX ORDER — METHYLPREDNISOLONE ACETATE 80 MG/ML
80 INJECTION, SUSPENSION INTRA-ARTICULAR; INTRALESIONAL; INTRAMUSCULAR; SOFT TISSUE
Status: COMPLETED | OUTPATIENT
Start: 2024-10-10 | End: 2024-10-10

## 2024-10-12 RX ORDER — LIDOCAINE HYDROCHLORIDE 10 MG/ML
2 INJECTION, SOLUTION EPIDURAL; INFILTRATION; INTRACAUDAL; PERINEURAL
Status: COMPLETED | OUTPATIENT
Start: 2024-10-10 | End: 2024-10-10

## 2024-11-01 ENCOUNTER — HOSPITAL ENCOUNTER (OUTPATIENT)
Facility: HOSPITAL | Age: 69
Discharge: HOME | End: 2024-11-01
Admitting: NURSE PRACTITIONER
Payer: MEDICARE

## 2024-11-01 DIAGNOSIS — Z12.31 SCREENING MAMMOGRAM FOR BREAST CANCER: ICD-10-CM

## 2024-11-01 PROCEDURE — 77063 BREAST TOMOSYNTHESIS BI: CPT

## 2024-11-01 PROCEDURE — 77067 SCR MAMMO BI INCL CAD: CPT

## 2024-11-29 DIAGNOSIS — M17.11 PRIMARY OSTEOARTHRITIS OF RIGHT KNEE: ICD-10-CM

## 2024-11-29 RX ORDER — CELECOXIB 200 MG/1
200 CAPSULE ORAL DAILY
Qty: 30 CAPSULE | Refills: 5 | Status: SHIPPED | OUTPATIENT
Start: 2024-11-29 | End: 2024-12-02 | Stop reason: SDUPTHER

## 2024-12-02 ENCOUNTER — OFFICE VISIT (OUTPATIENT)
Dept: FAMILY MEDICINE CLINIC | Facility: CLINIC | Age: 69
End: 2024-12-02
Payer: MEDICARE

## 2024-12-02 VITALS
RESPIRATION RATE: 18 BRPM | BODY MASS INDEX: 32.65 KG/M2 | DIASTOLIC BLOOD PRESSURE: 72 MMHG | WEIGHT: 196 LBS | SYSTOLIC BLOOD PRESSURE: 123 MMHG | HEIGHT: 65 IN | HEART RATE: 78 BPM | TEMPERATURE: 97.5 F | OXYGEN SATURATION: 97 %

## 2024-12-02 DIAGNOSIS — E55.9 VITAMIN D DEFICIENCY: ICD-10-CM

## 2024-12-02 DIAGNOSIS — Z00.00 MEDICARE ANNUAL WELLNESS VISIT, SUBSEQUENT: Primary | ICD-10-CM

## 2024-12-02 DIAGNOSIS — I10 PRIMARY HYPERTENSION: ICD-10-CM

## 2024-12-02 DIAGNOSIS — M17.11 PRIMARY OSTEOARTHRITIS OF RIGHT KNEE: ICD-10-CM

## 2024-12-02 PROCEDURE — G0439 PPPS, SUBSEQ VISIT: HCPCS | Performed by: FAMILY MEDICINE

## 2024-12-02 PROCEDURE — 3074F SYST BP LT 130 MM HG: CPT | Performed by: FAMILY MEDICINE

## 2024-12-02 PROCEDURE — 1125F AMNT PAIN NOTED PAIN PRSNT: CPT | Performed by: FAMILY MEDICINE

## 2024-12-02 PROCEDURE — 3078F DIAST BP <80 MM HG: CPT | Performed by: FAMILY MEDICINE

## 2024-12-02 PROCEDURE — 99213 OFFICE O/P EST LOW 20 MIN: CPT | Performed by: FAMILY MEDICINE

## 2024-12-02 RX ORDER — CELECOXIB 200 MG/1
200 CAPSULE ORAL DAILY
Qty: 90 CAPSULE | Refills: 5 | Status: SHIPPED | OUTPATIENT
Start: 2024-12-02

## 2024-12-02 RX ORDER — AMLODIPINE BESYLATE 5 MG/1
5 TABLET ORAL DAILY
Qty: 90 TABLET | Refills: 4 | Status: SHIPPED | OUTPATIENT
Start: 2024-12-02

## 2024-12-02 NOTE — PROGRESS NOTES
Subjective   The ABCs of the Annual Wellness Visit  Medicare Wellness Visit      Sweta Ceja is a 69 y.o. patient who presents for a Medicare Wellness Visit.    The following portions of the patient's history were reviewed and   updated as appropriate: allergies, current medications, past family history, past medical history, past social history, past surgical history, and problem list.    Compared to one year ago, the patient's physical   health is the same.  Compared to one year ago, the patient's mental   health is the same.    Recent Hospitalizations:  She was not admitted to the hospital during the last year.     Current Medical Providers:  Patient Care Team:  Georges Brooke MD as PCP - General (Family Medicine)    Outpatient Medications Prior to Visit   Medication Sig Dispense Refill    B Complex Vitamins (vitamin b complex) capsule capsule Take 1 capsule by mouth Daily.      calcium carbonate (OS-JOSIE) 600 MG tablet Take 1 tablet by mouth Daily.      Cholecalciferol (VITAMIN D) 2000 units tablet Take 1 tablet by mouth Daily.      Glucosamine-Chondroit-Vit C-Mn (Glucosamine-Chondroitin Max St) capsule Take  by mouth.      Multiple Minerals (Calcium-Magnesium-Zinc) tablet       omeprazole (priLOSEC) 40 MG capsule TAKE 1 CAPSULE DAILY 90 capsule 3    amLODIPine (NORVASC) 5 MG tablet Take 1 tablet by mouth Daily. 90 tablet 4    celecoxib (CeleBREX) 200 MG capsule Take 1 capsule by mouth once daily 30 capsule 5     No facility-administered medications prior to visit.     No opioid medication identified on active medication list. I have reviewed chart for other potential  high risk medication/s and harmful drug interactions in the elderly.      Aspirin is not on active medication list.  Aspirin use is not indicated based on review of current medical condition/s. Risk of harm outweighs potential benefits.  .    Patient Active Problem List   Diagnosis    Gastro-esophageal reflux disease without esophagitis     "Hyperlipidemia    Insomnia    Obesity    Vitamin D deficiency    Medicare annual wellness visit, subsequent    Chronic fatigue    Cough    Burn    Immunization deficiency    Colon cancer screening    Encounter for hepatitis C screening test for low risk patient    Hand pain    Difficulty with speech    Postural dizziness with presyncope    Change in mental status    Primary hypertension    Hyperglycemia    Primary osteoarthritis of right knee    Nocturnal leg cramps     Advance Care Planning Advance Directive is not on file.  ACP discussion was held with the patient during this visit. Patient has an advance directive (not in EMR), copy requested.            Objective   Vitals:    24 1251   BP: 123/72   BP Location: Left arm   Patient Position: Sitting   Cuff Size: Adult   Pulse: 78   Resp: 18   Temp: 97.5 °F (36.4 °C)   TempSrc: Temporal   SpO2: 97%   Weight: 88.9 kg (196 lb)   Height: 165.2 cm (65.04\")       Estimated body mass index is 32.58 kg/m² as calculated from the following:    Height as of this encounter: 165.2 cm (65.04\").    Weight as of this encounter: 88.9 kg (196 lb).    BMI is >= 30 and <35. (Class 1 Obesity). The following options were offered after discussion;: weight loss educational material (shared in after visit summary)       Does the patient have evidence of cognitive impairment? No                                                                                                Health  Risk Assessment    Smoking Status:  Social History     Tobacco Use   Smoking Status Former    Current packs/day: 0.00    Average packs/day: 0.5 packs/day for 29.9 years (15.0 ttl pk-yrs)    Types: Cigarettes    Start date: 1971    Quit date: 2001    Years since quittin.9   Smokeless Tobacco Never     Alcohol Consumption:  Social History     Substance and Sexual Activity   Alcohol Use Not Currently    Comment: socially,rare       Fall Risk Screen  STEADI Fall Risk Assessment was completed, and " patient is at LOW risk for falls.Assessment completed on:2024    Depression Screening   Little interest or pleasure in doing things? Not at all   Feeling down, depressed, or hopeless? Not at all   PHQ-2 Total Score 0      Health Habits and Functional and Cognitive Screenin/25/2024     8:07 AM   Functional & Cognitive Status   Do you have difficulty preparing food and eating? No    Do you have difficulty bathing yourself, getting dressed or grooming yourself? No    Do you have difficulty using the toilet? No    Do you have difficulty moving around from place to place? No    Do you have trouble with steps or getting out of a bed or a chair? No    Current Diet Other    Dental Exam Up to date    Eye Exam Up to date    Exercise (times per week) 3 times per week    Current Exercises Include House Cleaning;Other    Do you need help using the phone?  No    Are you deaf or do you have serious difficulty hearing?  No    Do you need help to go to places out of walking distance? No    Do you need help shopping? No    Do you need help preparing meals?  No    Do you need help with housework?  No    Do you need help with laundry? No    Do you need help taking your medications? No    Do you need help managing money? No    Do you ever drive or ride in a car without wearing a seat belt? No    Have you felt unusual stress, anger or loneliness in the last month? No    Who do you live with? Spouse    If you need help, do you have trouble finding someone available to you? No    Have you been bothered in the last four weeks by sexual problems? No    Do you have difficulty concentrating, remembering or making decisions? No        Patient-reported           Age-appropriate Screening Schedule:  Refer to the list below for future screening recommendations based on patient's age, sex and/or medical conditions. Orders for these recommended tests are listed in the plan section. The patient has been provided with a written  plan.    Health Maintenance List  Health Maintenance   Topic Date Due    TDAP/TD VACCINES (2 - Tdap) 07/31/2010    ZOSTER VACCINE (2 of 2) 08/23/2018    DXA SCAN  07/30/2022    LIPID PANEL  10/03/2024    ANNUAL WELLNESS VISIT  12/02/2025    BMI FOLLOWUP  12/02/2025    MAMMOGRAM  11/01/2026    COLORECTAL CANCER SCREENING  03/27/2028    HEPATITIS C SCREENING  Completed    COVID-19 Vaccine  Completed    INFLUENZA VACCINE  Completed    Pneumococcal Vaccine 65+  Completed                                                                                                                                                CMS Preventative Services Quick Reference  Risk Factors Identified During Encounter  Inactivity/Sedentary: Patient was advised to exercise at least 150 minutes a week per CDC recommendations.    The above risks/problems have been discussed with the patient.  Pertinent information has been shared with the patient in the After Visit Summary.  An After Visit Summary and PPPS were made available to the patient.    Follow Up:   Next Medicare Wellness visit to be scheduled in 1 year.         Additional E&M Note during same encounter follows:  Patient has additional, significant, and separately identifiable condition(s)/problem(s) that require work above and beyond the Medicare Wellness Visit     Chief Complaint  Medicare Wellness-subsequent    Subjective   HPI  Sweta is also being seen today for an annual adult preventative physical exam.     Review of Systems   Constitutional:  Negative for chills, diaphoresis and fatigue.   HENT:  Negative for rhinorrhea.    Respiratory:  Negative for cough and shortness of breath.    Cardiovascular:  Negative for chest pain and leg swelling.   Gastrointestinal:  Negative for abdominal distention and abdominal pain.   Musculoskeletal:  Negative for arthralgias and back pain.   Skin:  Negative for rash.      C/o R knee pain.  Worse with increasing time standing.  No SE.   "        Objective   Vital Signs:  /72 (BP Location: Left arm, Patient Position: Sitting, Cuff Size: Adult)   Pulse 78   Temp 97.5 °F (36.4 °C) (Temporal)   Resp 18   Ht 165.2 cm (65.04\")   Wt 88.9 kg (196 lb)   SpO2 97%   BMI 32.58 kg/m²   Physical Exam  Vitals reviewed.   Constitutional:       Appearance: She is well-developed. She is not diaphoretic.   HENT:      Head: Normocephalic and atraumatic.   Eyes:      General: No scleral icterus.     Pupils: Pupils are equal, round, and reactive to light.   Neck:      Thyroid: No thyromegaly.   Cardiovascular:      Rate and Rhythm: Normal rate and regular rhythm.      Heart sounds: No murmur heard.     No friction rub. No gallop.   Pulmonary:      Effort: Pulmonary effort is normal. No respiratory distress.      Breath sounds: No wheezing or rales.   Chest:      Chest wall: No tenderness.   Abdominal:      General: Bowel sounds are normal. There is no distension.      Palpations: Abdomen is soft.      Tenderness: There is no abdominal tenderness.   Musculoskeletal:         General: No deformity. Normal range of motion.   Lymphadenopathy:      Cervical: No cervical adenopathy.   Skin:     General: Skin is warm and dry.      Findings: No rash.   Neurological:      Cranial Nerves: No cranial nerve deficit.      Motor: No abnormal muscle tone.                       Assessment and Plan            Medicare annual wellness visit, subsequent    Orders:    CBC & Differential    Primary osteoarthritis of right knee    Orders:    celecoxib (CeleBREX) 200 MG capsule; Take 1 capsule by mouth Daily.    Vitamin D deficiency    Orders:    Vitamin D,25-Hydroxy    Primary hypertension      Orders:    amLODIPine (NORVASC) 5 MG tablet; Take 1 tablet by mouth Daily.    Comprehensive Metabolic Panel    Lipid Panel With / Chol / HDL Ratio    OA of R knee . Uncontrolled.  Add tylenol 1000 TID prn pain.  Continue celebrex 200 a day.      Preventive Counseling:  Encouraged to stay " active.  Covid vaccine UTD.  Flu utd.  RSV utd.  HEP A UTD.  Pneumovax UTD.  Colonoscopy UTD.  Shingrix recommended.    Dentist UTD.  Optho UTD.           Follow Up   No follow-ups on file.  Patient was given instructions and counseling regarding her condition or for health maintenance advice. Please see specific information pulled into the AVS if appropriate.

## 2024-12-02 NOTE — ASSESSMENT & PLAN NOTE
Orders:    amLODIPine (NORVASC) 5 MG tablet; Take 1 tablet by mouth Daily.    Comprehensive Metabolic Panel    Lipid Panel With / Chol / HDL Ratio

## 2024-12-03 LAB
25(OH)D3+25(OH)D2 SERPL-MCNC: 50.5 NG/ML (ref 30–100)
ALBUMIN SERPL-MCNC: 4.7 G/DL (ref 3.8–4.8)
ALP SERPL-CCNC: 67 IU/L (ref 44–121)
ALT SERPL-CCNC: 20 IU/L (ref 0–32)
AST SERPL-CCNC: 24 IU/L (ref 0–40)
BASOPHILS # BLD AUTO: 0 X10E3/UL (ref 0–0.2)
BASOPHILS NFR BLD AUTO: 1 %
BILIRUB SERPL-MCNC: 0.5 MG/DL (ref 0–1.2)
BUN SERPL-MCNC: 13 MG/DL (ref 8–27)
BUN/CREAT SERPL: 13 (ref 12–28)
CALCIUM SERPL-MCNC: 9.7 MG/DL (ref 8.7–10.3)
CHLORIDE SERPL-SCNC: 103 MMOL/L (ref 96–106)
CHOLEST SERPL-MCNC: 233 MG/DL (ref 100–199)
CHOLEST/HDLC SERPL: 5 RATIO (ref 0–4.4)
CO2 SERPL-SCNC: 22 MMOL/L (ref 20–29)
CREAT SERPL-MCNC: 0.99 MG/DL (ref 0.57–1)
EGFRCR SERPLBLD CKD-EPI 2021: 61 ML/MIN/1.73
EOSINOPHIL # BLD AUTO: 0.1 X10E3/UL (ref 0–0.4)
EOSINOPHIL NFR BLD AUTO: 1 %
ERYTHROCYTE [DISTWIDTH] IN BLOOD BY AUTOMATED COUNT: 13 % (ref 11.7–15.4)
GLOBULIN SER CALC-MCNC: 2.7 G/DL (ref 1.5–4.5)
GLUCOSE SERPL-MCNC: 88 MG/DL (ref 70–99)
HCT VFR BLD AUTO: 42.8 % (ref 34–46.6)
HDLC SERPL-MCNC: 47 MG/DL
HGB BLD-MCNC: 13.9 G/DL (ref 11.1–15.9)
IMM GRANULOCYTES # BLD AUTO: 0 X10E3/UL (ref 0–0.1)
IMM GRANULOCYTES NFR BLD AUTO: 0 %
LDLC SERPL CALC-MCNC: 142 MG/DL (ref 0–99)
LYMPHOCYTES # BLD AUTO: 2.2 X10E3/UL (ref 0.7–3.1)
LYMPHOCYTES NFR BLD AUTO: 29 %
MCH RBC QN AUTO: 28.9 PG (ref 26.6–33)
MCHC RBC AUTO-ENTMCNC: 32.5 G/DL (ref 31.5–35.7)
MCV RBC AUTO: 89 FL (ref 79–97)
MONOCYTES # BLD AUTO: 0.6 X10E3/UL (ref 0.1–0.9)
MONOCYTES NFR BLD AUTO: 7 %
NEUTROPHILS # BLD AUTO: 4.9 X10E3/UL (ref 1.4–7)
NEUTROPHILS NFR BLD AUTO: 62 %
PLATELET # BLD AUTO: 315 X10E3/UL (ref 150–450)
POTASSIUM SERPL-SCNC: 4.5 MMOL/L (ref 3.5–5.2)
PROT SERPL-MCNC: 7.4 G/DL (ref 6–8.5)
RBC # BLD AUTO: 4.81 X10E6/UL (ref 3.77–5.28)
SODIUM SERPL-SCNC: 140 MMOL/L (ref 134–144)
TRIGL SERPL-MCNC: 242 MG/DL (ref 0–149)
VLDLC SERPL CALC-MCNC: 44 MG/DL (ref 5–40)
WBC # BLD AUTO: 7.8 X10E3/UL (ref 3.4–10.8)

## 2024-12-16 DIAGNOSIS — K21.9 GASTRO-ESOPHAGEAL REFLUX DISEASE WITHOUT ESOPHAGITIS: ICD-10-CM

## 2024-12-16 RX ORDER — OMEPRAZOLE 40 MG/1
40 CAPSULE, DELAYED RELEASE ORAL DAILY
Qty: 90 CAPSULE | Refills: 3 | Status: SHIPPED | OUTPATIENT
Start: 2024-12-16

## 2024-12-16 NOTE — TELEPHONE ENCOUNTER
DELETE AFTER REVIEWING: Send the encounter HIGH priority, If patient has less than a 3 day supply. If the patient will run out of medication over the weekend add that information to the additional details line. Send to the appropriate pool. Check your call action grid or workflows.    Caller: Sweta Ceja    Relationship: Self    Best call back number:     [phone] (Mobile)       Requested Prescriptions:   Requested Prescriptions     Pending Prescriptions Disp Refills    omeprazole (priLOSEC) 40 MG capsule 90 capsule 3     Sig: Take 1 capsule by mouth Daily.        Pharmacy where request should be sent: [id number] [phone] [facility] [phone] [facility]     Last office visit with prescribing clinician: 12/2/2024   Last telemedicine visit with prescribing clinician: Visit date not found   Next office visit with prescribing clinician: 6/2/2025     Additional details provided by patient: HAS ABOUT ONE WEEK LEFT.     Does the patient have less than a 3 day supply:  [] Yes  [x] No    Would you like a call back once the refill request has been completed: [] Yes [x] No    If the office needs to give you a call back, can they leave a voicemail: [] Yes [x] No    Tsering Ramirez Rep   12/16/24 09:04 EST

## 2025-01-23 ENCOUNTER — OFFICE VISIT (OUTPATIENT)
Age: 70
End: 2025-01-23
Payer: MEDICARE

## 2025-01-23 VITALS — HEIGHT: 65 IN | BODY MASS INDEX: 32.65 KG/M2 | WEIGHT: 196 LBS | TEMPERATURE: 98.1 F

## 2025-01-23 DIAGNOSIS — M25.561 CHRONIC PAIN OF RIGHT KNEE: Primary | ICD-10-CM

## 2025-01-23 DIAGNOSIS — M17.11 ARTHRITIS OF RIGHT KNEE: ICD-10-CM

## 2025-01-23 DIAGNOSIS — G89.29 CHRONIC PAIN OF RIGHT KNEE: Primary | ICD-10-CM

## 2025-01-23 RX ORDER — LIDOCAINE HYDROCHLORIDE 10 MG/ML
2 INJECTION, SOLUTION EPIDURAL; INFILTRATION; INTRACAUDAL; PERINEURAL
Status: COMPLETED | OUTPATIENT
Start: 2025-01-23 | End: 2025-01-23

## 2025-01-23 RX ORDER — METHYLPREDNISOLONE ACETATE 80 MG/ML
80 INJECTION, SUSPENSION INTRA-ARTICULAR; INTRALESIONAL; INTRAMUSCULAR; SOFT TISSUE
Status: COMPLETED | OUTPATIENT
Start: 2025-01-23 | End: 2025-01-23

## 2025-01-23 RX ADMIN — LIDOCAINE HYDROCHLORIDE 2 ML: 10 INJECTION, SOLUTION EPIDURAL; INFILTRATION; INTRACAUDAL; PERINEURAL at 13:15

## 2025-01-23 RX ADMIN — METHYLPREDNISOLONE ACETATE 80 MG: 80 INJECTION, SUSPENSION INTRA-ARTICULAR; INTRALESIONAL; INTRAMUSCULAR; SOFT TISSUE at 13:15

## 2025-01-23 NOTE — PROGRESS NOTES
Ms. Ceja comes in today for follow-up.  Injections have worked well in the past.  The patient would like to get a repeat injection today.  The risks, benefits and alternatives were discussed and the patient consented.  Going forward, the patient will follow-up as needed.    SHASTA Nunes    01/23/2025      Large Joint Arthrocentesis: R knee  Date/Time: 1/23/2025 1:15 PM  Consent given by: patient  Site marked: site marked  Timeout: Immediately prior to procedure a time out was called to verify the correct patient, procedure, equipment, support staff and site/side marked as required   Supporting Documentation  Indications: pain and joint swelling   Procedure Details  Location: knee - R knee  Preparation: Patient was prepped and draped in the usual sterile fashion  Needle gauge: 21g.  Approach: anterolateral  Medications administered: 80 mg methylPREDNISolone acetate 80 MG/ML; 2 mL lidocaine PF 1% 1 %  Patient tolerance: patient tolerated the procedure well with no immediate complications

## 2025-02-28 ENCOUNTER — OFFICE VISIT (OUTPATIENT)
Dept: ORTHOPEDIC SURGERY | Facility: CLINIC | Age: 70
End: 2025-02-28
Payer: MEDICARE

## 2025-02-28 VITALS — BODY MASS INDEX: 32.94 KG/M2 | WEIGHT: 197.7 LBS | HEIGHT: 65 IN | TEMPERATURE: 98.4 F

## 2025-02-28 DIAGNOSIS — M17.11 ARTHRITIS OF RIGHT KNEE: Primary | ICD-10-CM

## 2025-02-28 PROCEDURE — 99214 OFFICE O/P EST MOD 30 MIN: CPT | Performed by: ORTHOPAEDIC SURGERY

## 2025-02-28 RX ORDER — ACETAMINOPHEN 325 MG/1
1000 TABLET ORAL ONCE
OUTPATIENT
Start: 2025-02-28 | End: 2025-02-28

## 2025-02-28 RX ORDER — CHLORHEXIDINE GLUCONATE 500 MG/1
CLOTH TOPICAL 2 TIMES DAILY
OUTPATIENT
Start: 2025-02-28

## 2025-02-28 RX ORDER — PREGABALIN 150 MG/1
150 CAPSULE ORAL ONCE
OUTPATIENT
Start: 2025-02-28 | End: 2025-02-28

## 2025-02-28 NOTE — PROGRESS NOTES
Patient: Sweta Ceja    YOB: 1955    Medical Record Number: [id number]    Chief Complaints: Referral for right knee arthritis    History of Present Illness:     70 y.o. female patient seen for evaluation of her right knee.  She was referred by Yamila.  She reports a long history of worsening right knee pain and dysfunction.  She was previously treated by another physician.  She has tried activity modifications, prescription strength anti-inflammatories, formal physical therapy, Visco injections and 2 cortisone injections.  The initial cortisone injection helped but the second injection really only helped for a few weeks.  Her current pain is severe and constant.  Most the pain seems to be medial.  She is referred to me to discuss possible total knee arthroplasty.    Allergies   Allergen Reactions    Lodine [Etodolac] Hives       Current Outpatient Medications:     amLODIPine (NORVASC) 5 MG tablet, Take 1 tablet by mouth Daily., Disp: 90 tablet, Rfl: 4    calcium carbonate (OS-JOSIE) 600 MG tablet, Take 1 tablet by mouth Daily., Disp: , Rfl:     celecoxib (CeleBREX) 200 MG capsule, Take 1 capsule by mouth Daily., Disp: 90 capsule, Rfl: 5    Cholecalciferol (VITAMIN D) 2000 units tablet, Take 1 tablet by mouth Daily., Disp: , Rfl:     Glucosamine-Chondroit-Vit C-Mn (Glucosamine-Chondroitin Max St) capsule, Take  by mouth., Disp: , Rfl:     Multiple Minerals (Calcium-Magnesium-Zinc) tablet, , Disp: , Rfl:     omeprazole (priLOSEC) 40 MG capsule, Take 1 capsule by mouth Daily., Disp: 90 capsule, Rfl: 3    Past Medical History:   Diagnosis Date    Chest pain     Chronic lung disease     COPD (chronic obstructive pulmonary disease)     GERD (gastroesophageal reflux disease)     Hyperlipidemia     Postmenopausal     SVT (supraventricular tachycardia)        Past Surgical History:   Procedure Laterality Date    ABLATION OF DYSRHYTHMIC FOCUS      CARDIAC CATHETERIZATION       SECTION       CHOLECYSTECTOMY      COLONOSCOPY N/A 3/27/2023    Procedure: COLONOSCOPY FOR SCREENING;  Surgeon: Celestino Meza MD;  [place] [facility];  Service: Gastroenterology;  Laterality: N/A;  diverticulosis, polyps, hemorrhoids    HYSTERECTOMY      KNEE ARTHROSCOPY Bilateral     meniscus repair       Social History     Occupational History    Not on file   Tobacco Use    Smoking status: Former     Current packs/day: 0.00     Average packs/day: 0.5 packs/day for 29.9 years (15.0 ttl pk-yrs)     Types: Cigarettes     Start date: 1971     Quit date: 2001     Years since quittin.1    Smokeless tobacco: Never   Vaping Use    Vaping status: Never Used   Substance and Sexual Activity    Alcohol use: Not Currently     Comment: socially,rare    Drug use: Never    Sexual activity: Not Currently     Partners: Male      Social History     Social History Narrative    Not on file       Family History   Problem Relation Age of Onset    Atrial fibrillation Mother     Cerebral aneurysm Mother     Transient ischemic attack Mother     Hypertension Mother     Breast cancer Mother     Arthritis Mother     Diabetes type I Father     Kidney disease Father        Review of Systems:      Constitutional: Denies fever, shaking or chills   Eyes: Denies change in visual acuity   HEENT: Denies nasal congestion or sore throat   Respiratory: Denies cough or shortness of breath   Cardiovascular: Denies chest pain or edema  Endocrine: Denies tremors, palpitations, intolerance of heat or cold, polyuria, polydipsia.  GI: Denies abdominal pain, nausea, vomiting, bloody stools or diarrhea  : Denies frequency, urgency, incontinence, retention, or nocturia.  Musculoskeletal: Denies numbness, tingling or loss of motor function except as above  Integument: Denies rash, lesion or ulceration   Neurologic: Denies headache or focal weakness, deficits  Heme: Denies spontaneous or excessive bleeding, epistaxis, hematuria, melena, fatigue,  enlarged or tender lymph nodes.      All other pertinent positives and negatives as noted above in HPI.    Physical Exam:   70 y.o. female  There were no vitals filed for this visit.  General:  Patient is awake and alert.  Appears in no acute distress or discomfort.    Psych:  Affect and demeanor are appropriate.    Extremities:  Right knee is examined.  Skin is benign.  Moderate medial compartment tenderness.  Motion: 2-115.  Normal motor and sensory function in the lower leg and foot.  Palpable pedal pulses.  Brisk capillary refill.    Imaging: Previous x-rays of the right knee are reviewed.  These are from October 2024.  The x-rays show advanced medial and patellofemoral compartment osteoarthritis with bone-on-bone best visualized on the lateral view.  She has associated osteophyte formation and subchondral sclerosis    Assessment/Plan:  Right knee end-stage osteoarthritis    We discussed the natural history of this condition and all available treatment options, both surgical and nonsurgical.  The risk, benefits and alternatives to a total knee arthroplasty were carefully discussed all questions posed by the patient were answered in detail. She wants to move forward with a total knee arthroplasty.  We will look at getting this scheduled.  She is hoping to target the end of the school year, perhaps in May or June.  I will have my  reach out to her.  I will have her follow-up with either myself or Yamila for a preoperative visit.    Elbert eBlle MD    02/28/2025

## 2025-03-27 ENCOUNTER — TELEMEDICINE (OUTPATIENT)
Dept: FAMILY MEDICINE CLINIC | Facility: CLINIC | Age: 70
End: 2025-03-27
Payer: MEDICARE

## 2025-03-27 DIAGNOSIS — K52.9 GASTROENTERITIS: Primary | ICD-10-CM

## 2025-03-27 RX ORDER — ONDANSETRON 4 MG/1
4 TABLET, ORALLY DISINTEGRATING ORAL EVERY 8 HOURS PRN
Qty: 15 TABLET | Refills: 0 | Status: SHIPPED | OUTPATIENT
Start: 2025-03-27

## 2025-03-27 NOTE — PROGRESS NOTES
C/o lightheaded.    Subjective   Sweta Ceja is a 70 y.o. female.     History of Present Illness   Mode of visit: video  Video visit due to patient unable to come the office due to transportation issues.   You have chosen to receive care through a telehealth visit. Consent obtained from patient to use a video/audio connection for medical care today.  Provider is in the office.  Patient is at home.    The visit included audio and video interaction.  No technical issues occurred during this visit.  8-minute visit.  C/o weakness and shakiness for 2 days.  Stomach pain around umbilicus today. Had emesis this am and felt better.  Some loose stool as well.  No fever.  BP down to 96/70 this am.  And 125/60 later.  Works in a .  The following portions of the patient's history were reviewed and updated as appropriate: allergies, current medications, past family history, past medical history, past social history, past surgical history and problem list.    Review of Systems   Constitutional:  Negative for appetite change and fatigue.   HENT:  Negative for nosebleeds and sore throat.    Eyes:  Negative for blurred vision and visual disturbance.   Respiratory:  Negative for shortness of breath and wheezing.    Cardiovascular:  Negative for chest pain and leg swelling.   Gastrointestinal:  Positive for abdominal pain, diarrhea and nausea. Negative for abdominal distention.   Endocrine: Negative for cold intolerance and polyuria.   Genitourinary:  Negative for dysuria and hematuria.   Musculoskeletal:  Negative for arthralgias and myalgias.   Skin:  Negative for color change and rash.   Neurological:  Negative for weakness and confusion.   Psychiatric/Behavioral:  Negative for agitation and depressed mood.        Patient Active Problem List   Diagnosis    Gastro-esophageal reflux disease without esophagitis    Hyperlipidemia    Insomnia    Obesity    Vitamin D deficiency    Medicare annual wellness visit, subsequent     Chronic fatigue    Cough    Burn    Immunization deficiency    Colon cancer screening    Encounter for hepatitis C screening test for low risk patient    Hand pain    Difficulty with speech    Postural dizziness with presyncope    Change in mental status    Primary hypertension    Hyperglycemia    Primary osteoarthritis of right knee    Nocturnal leg cramps    Arthritis of right knee    Arthritis of knee, right    Gastroenteritis       Allergies   Allergen Reactions    Lodine [Etodolac] Hives         Current Outpatient Medications:     amLODIPine (NORVASC) 5 MG tablet, Take 1 tablet by mouth Daily., Disp: 90 tablet, Rfl: 4    calcium carbonate (OS-JOSIE) 600 MG tablet, Take 1 tablet by mouth Daily., Disp: , Rfl:     celecoxib (CeleBREX) 200 MG capsule, Take 1 capsule by mouth Daily., Disp: 90 capsule, Rfl: 5    Cholecalciferol (VITAMIN D) 2000 units tablet, Take 1 tablet by mouth Daily., Disp: , Rfl:     Glucosamine-Chondroit-Vit C-Mn (Glucosamine-Chondroitin Max St) capsule, Take  by mouth., Disp: , Rfl:     Multiple Minerals (Calcium-Magnesium-Zinc) tablet, , Disp: , Rfl:     omeprazole (priLOSEC) 40 MG capsule, Take 1 capsule by mouth Daily., Disp: 90 capsule, Rfl: 3    Past Medical History:   Diagnosis Date    Chest pain     Chronic lung disease     COPD (chronic obstructive pulmonary disease)     GERD (gastroesophageal reflux disease)     Hyperlipidemia     Knee swelling Feb 22    Postmenopausal     SVT (supraventricular tachycardia)     Tear of meniscus of knee        Past Surgical History:   Procedure Laterality Date    ABLATION OF DYSRHYTHMIC FOCUS      CARDIAC CATHETERIZATION       SECTION      CHOLECYSTECTOMY      COLONOSCOPY N/A 3/27/2023    Procedure: COLONOSCOPY FOR SCREENING;  Surgeon: Celestino Meza MD;  [place] [facility] [place]  Service: Gastroenterology;  Laterality: N/A;  diverticulosis, polyps, hemorrhoids    HYSTERECTOMY      KNEE ARTHROSCOPY Bilateral     meniscus repair        Family History   Problem Relation Age of Onset    Atrial fibrillation Mother     Cerebral aneurysm Mother     Transient ischemic attack Mother     Hypertension Mother     Breast cancer Mother     Arthritis Mother     Cancer Mother     Diabetes type I Father     Kidney disease Father     Diabetes Father        Social History     Tobacco Use    Smoking status: Former     Current packs/day: 0.00     Average packs/day: 0.5 packs/day for 29.9 years (15.0 ttl pk-yrs)     Types: Cigarettes     Start date: 1971     Quit date: 2001     Years since quittin.2     Passive exposure: Past    Smokeless tobacco: Never   Substance Use Topics    Alcohol use: Never     Comment: socially,rare            Objective     There were no vitals filed for this visit.  There is no height or weight on file to calculate BMI.    Physical Exam  Constitutional:       Appearance: She is well-developed.   HENT:      Head: Normocephalic and atraumatic.   Pulmonary:      Breath sounds: Normal breath sounds.   Neurological:      Mental Status: She is alert and oriented to person, place, and time.   Psychiatric:         Behavior: Behavior normal.         Thought Content: Thought content normal.         Judgment: Judgment normal.         Lab Results   Component Value Date    GLUCOSE 88 2024    BUN 13 2024    CREATININE 0.99 2024    EGFRIFNONA 78 10/04/2021    EGFRIFAFRI 95 10/04/2021    BCR 13 2024    K 4.5 2024    CO2 22 2024    CALCIUM 9.7 2024    ALBUMIN 4.7 2024    AST 24 2024    ALT 20 2024       WBC   Date Value Ref Range Status   2024 7.8 3.4 - 10.8 x10E3/uL Final     RBC   Date Value Ref Range Status   2024 4.81 3.77 - 5.28 x10E6/uL Final     Hemoglobin   Date Value Ref Range Status   2024 13.9 11.1 - 15.9 g/dL Final   2023 13.9 12.0 - 15.9 g/dL Final     Hematocrit   Date Value Ref Range Status   2024 42.8 34.0 - 46.6 % Final   2023  40.3 34.0 - 46.6 % Final     MCV   Date Value Ref Range Status   12/02/2024 89 79 - 97 fL Final   05/30/2023 86.5 79.0 - 97.0 fL Final     MCH   Date Value Ref Range Status   12/02/2024 28.9 26.6 - 33.0 pg Final   05/30/2023 29.8 26.6 - 33.0 pg Final     MCHC   Date Value Ref Range Status   12/02/2024 32.5 31.5 - 35.7 g/dL Final   05/30/2023 34.5 31.5 - 35.7 g/dL Final     RDW   Date Value Ref Range Status   12/02/2024 13.0 11.7 - 15.4 % Final   05/30/2023 12.6 12.3 - 15.4 % Final     RDW-SD   Date Value Ref Range Status   05/30/2023 40.0 37.0 - 54.0 fl Final     MPV   Date Value Ref Range Status   05/30/2023 8.8 6.0 - 12.0 fL Final     Platelets   Date Value Ref Range Status   12/02/2024 315 150 - 450 x10E3/uL Final   05/30/2023 256 140 - 450 10*3/mm3 Final     Neutrophil Rel %   Date Value Ref Range Status   12/02/2024 62 Not Estab. % Final     Neutrophil %   Date Value Ref Range Status   05/30/2023 56.9 42.7 - 76.0 % Final     Lymphocyte Rel %   Date Value Ref Range Status   12/02/2024 29 Not Estab. % Final     Lymphocyte %   Date Value Ref Range Status   05/30/2023 29.9 19.6 - 45.3 % Final     Monocyte Rel %   Date Value Ref Range Status   12/02/2024 7 Not Estab. % Final     Monocyte %   Date Value Ref Range Status   05/30/2023 9.7 5.0 - 12.0 % Final     Eosinophil Rel %   Date Value Ref Range Status   12/02/2024 1 Not Estab. % Final     Eosinophil %   Date Value Ref Range Status   05/30/2023 1.9 0.3 - 6.2 % Final     Basophil Rel %   Date Value Ref Range Status   12/02/2024 1 Not Estab. % Final     Basophil %   Date Value Ref Range Status   05/30/2023 0.8 0.0 - 1.5 % Final     Immature Grans %   Date Value Ref Range Status   05/30/2023 0.8 (H) 0.0 - 0.5 % Final     Neutrophils Absolute   Date Value Ref Range Status   12/02/2024 4.9 1.4 - 7.0 x10E3/uL Final     Neutrophils, Absolute   Date Value Ref Range Status   05/30/2023 4.41 1.70 - 7.00 10*3/mm3 Final     Lymphocytes Absolute   Date Value Ref Range Status  "  12/02/2024 2.2 0.7 - 3.1 x10E3/uL Final     Lymphocytes, Absolute   Date Value Ref Range Status   05/30/2023 2.32 0.70 - 3.10 10*3/mm3 Final     Monocytes Absolute   Date Value Ref Range Status   12/02/2024 0.6 0.1 - 0.9 x10E3/uL Final     Monocytes, Absolute   Date Value Ref Range Status   05/30/2023 0.75 0.10 - 0.90 10*3/mm3 Final     Eosinophils Absolute   Date Value Ref Range Status   12/02/2024 0.1 0.0 - 0.4 x10E3/uL Final     Eosinophils, Absolute   Date Value Ref Range Status   05/30/2023 0.15 0.00 - 0.40 10*3/mm3 Final     Basophils Absolute   Date Value Ref Range Status   12/02/2024 0.0 0.0 - 0.2 x10E3/uL Final     Basophils, Absolute   Date Value Ref Range Status   05/30/2023 0.06 0.00 - 0.20 10*3/mm3 Final     Immature Grans, Absolute   Date Value Ref Range Status   05/30/2023 0.06 (H) 0.00 - 0.05 10*3/mm3 Final     nRBC   Date Value Ref Range Status   05/30/2023 0.0 0.0 - 0.2 /100 WBC Final       Lab Results   Component Value Date    HGBA1C 5.90 (H) 02/20/2024       Lab Results   Component Value Date    GARQNIJB40 707 10/03/2023       TSH   Date Value Ref Range Status   10/03/2023 1.210 0.450 - 4.500 uIU/mL Final       No results found for: \"CHOL\"  Lab Results   Component Value Date    TRIG 242 (H) 12/02/2024     Lab Results   Component Value Date    HDL 47 12/02/2024     Lab Results   Component Value Date     (H) 12/02/2024     Lab Results   Component Value Date    VLDL 44 (H) 12/02/2024     No results found for: \"LDLHDL\"      Procedures    Assessment & Plan   Problems Addressed this Visit       Gastroenteritis - Primary     Diagnoses         Codes Comments      Gastroenteritis    -  Primary ICD-10-CM: K52.9  ICD-9-CM: 558.9           Likely viral from working in .  Informed patient to go to the ER if increasing abdominal pain or fever above 101.  Ondansetron Rx sent.  Encouraged p.o. fluids.  No orders of the defined types were placed in this encounter.      Current Outpatient " "Medications   Medication Sig Dispense Refill    amLODIPine (NORVASC) 5 MG tablet Take 1 tablet by mouth Daily. 90 tablet 4    calcium carbonate (OS-JOSIE) 600 MG tablet Take 1 tablet by mouth Daily.      celecoxib (CeleBREX) 200 MG capsule Take 1 capsule by mouth Daily. 90 capsule 5    Cholecalciferol (VITAMIN D) 2000 units tablet Take 1 tablet by mouth Daily.      Glucosamine-Chondroit-Vit C-Mn (Glucosamine-Chondroitin Max St) capsule Take  by mouth.      Multiple Minerals (Calcium-Magnesium-Zinc) tablet       omeprazole (priLOSEC) 40 MG capsule Take 1 capsule by mouth Daily. 90 capsule 3     No current facility-administered medications for this visit.       Sweta OSORIO Call \"Alesia\" had no medications administered during this visit.    No follow-ups on file.    There are no Patient Instructions on file for this visit.       "

## 2025-05-12 ENCOUNTER — PRE-ADMISSION TESTING (OUTPATIENT)
Dept: PREADMISSION TESTING | Facility: HOSPITAL | Age: 70
End: 2025-05-12
Payer: MEDICARE

## 2025-05-12 ENCOUNTER — TELEPHONE (OUTPATIENT)
Dept: ORTHOPEDIC SURGERY | Facility: HOSPITAL | Age: 70
End: 2025-05-12
Payer: MEDICARE

## 2025-05-12 VITALS
OXYGEN SATURATION: 96 % | DIASTOLIC BLOOD PRESSURE: 67 MMHG | TEMPERATURE: 97.8 F | WEIGHT: 188.9 LBS | RESPIRATION RATE: 16 BRPM | HEART RATE: 77 BPM | BODY MASS INDEX: 31.47 KG/M2 | HEIGHT: 65 IN | SYSTOLIC BLOOD PRESSURE: 142 MMHG

## 2025-05-12 LAB
ANION GAP SERPL CALCULATED.3IONS-SCNC: 10.9 MMOL/L (ref 5–15)
BUN SERPL-MCNC: 15 MG/DL (ref 8–23)
BUN/CREAT SERPL: 17.2 (ref 7–25)
CALCIUM SPEC-SCNC: 9.6 MG/DL (ref 8.6–10.5)
CHLORIDE SERPL-SCNC: 103 MMOL/L (ref 98–107)
CO2 SERPL-SCNC: 25.1 MMOL/L (ref 22–29)
CREAT SERPL-MCNC: 0.87 MG/DL (ref 0.57–1)
DEPRECATED RDW RBC AUTO: 38.5 FL (ref 37–54)
EGFRCR SERPLBLD CKD-EPI 2021: 70.9 ML/MIN/1.73
ERYTHROCYTE [DISTWIDTH] IN BLOOD BY AUTOMATED COUNT: 12.1 % (ref 12.3–15.4)
GLUCOSE SERPL-MCNC: 96 MG/DL (ref 65–99)
HCT VFR BLD AUTO: 40 % (ref 34–46.6)
HGB BLD-MCNC: 13.3 G/DL (ref 12–15.9)
MCH RBC QN AUTO: 29.1 PG (ref 26.6–33)
MCHC RBC AUTO-ENTMCNC: 33.3 G/DL (ref 31.5–35.7)
MCV RBC AUTO: 87.5 FL (ref 79–97)
PLATELET # BLD AUTO: 281 10*3/MM3 (ref 140–450)
PMV BLD AUTO: 9.3 FL (ref 6–12)
POTASSIUM SERPL-SCNC: 4.1 MMOL/L (ref 3.5–5.2)
QT INTERVAL: 423 MS
QTC INTERVAL: 425 MS
RBC # BLD AUTO: 4.57 10*6/MM3 (ref 3.77–5.28)
SODIUM SERPL-SCNC: 139 MMOL/L (ref 136–145)
WBC NRBC COR # BLD AUTO: 6.12 10*3/MM3 (ref 3.4–10.8)

## 2025-05-12 PROCEDURE — 85027 COMPLETE CBC AUTOMATED: CPT

## 2025-05-12 PROCEDURE — 80048 BASIC METABOLIC PNL TOTAL CA: CPT

## 2025-05-12 PROCEDURE — 93010 ELECTROCARDIOGRAM REPORT: CPT | Performed by: INTERNAL MEDICINE

## 2025-05-12 PROCEDURE — 36415 COLL VENOUS BLD VENIPUNCTURE: CPT

## 2025-05-12 PROCEDURE — 93005 ELECTROCARDIOGRAM TRACING: CPT

## 2025-05-12 NOTE — TELEPHONE ENCOUNTER
Risk Factor yes no   Age >75  X   BMI <20 >40  X   Patient History     Chronic Pain (2 or more meds/Pain Management)  X   ETOH (more than 3 drinks Daily)  X   Uncontrolled Depression/Bipolar/Schizoaffective Disorder  X   Arrhythmias--  X   Stent placement/MI  X   DVT/PE  X   Pacemaker  X   HTN (uncontrolled or requiring more than 2 medications)  X   CHF/Retained fluids/Edema  X   Stroke with Residual   X   COPD/Asthma  X   JERRY--Non-compliant with CPAP  X   Diabetes (on insulin or more than 2 meds)         A1C:  X   BPH/Urinary retention (on medication)  X   CKD  X   Home environment and support     Current ambulation status (use of cane, walker, W/C, Multiple falls/weakness)  X   Stairs to enter and throughout home  X   Lives Alone  X   Doesn't have support at home  X   Outpatient Screening Assessment    Home needs: (Walker/BSC):  Has walker  ? Steps No steps   Caregiver 24-48hrs post-discharge:      Discharge Plan:    PT    Prescriptions: Meds to bed    Home medications:   [] Blood thinner/anti-coag therapy--   [] BPH or diuretic--   ? BP meds-- Norvasc  ? Pain/Anti-inflammatories--Celebrex    Pre-op Education:  Educate patient on spinal anesthesia/pain control:  ? patient verbalize understanding  Educate patient on hospital course/timeline:  ?  patient verbalize understanding    Joint Care Class:  ?  yes [] no  Notes:

## 2025-05-16 ENCOUNTER — OFFICE VISIT (OUTPATIENT)
Dept: ORTHOPEDIC SURGERY | Facility: CLINIC | Age: 70
End: 2025-05-16
Payer: MEDICARE

## 2025-05-16 VITALS — TEMPERATURE: 98 F | HEIGHT: 65 IN | WEIGHT: 188.4 LBS | BODY MASS INDEX: 31.39 KG/M2

## 2025-05-16 DIAGNOSIS — Z01.818 PREOP EXAMINATION: Primary | ICD-10-CM

## 2025-05-16 NOTE — H&P (VIEW-ONLY)
History & Physical       Patient: Sweta Ceja    YOB: 1955    Medical Record Number: [id number]    Chief Complaints: Right knee endstage osteoarthritis    History of Present Illness: 70 y.o. female presents today in anticipation of upcoming knee replacement surgery.  Patient has a long history of worsening symptoms.  Describes the pain as severe, constant, and typically dull and achy. She has tried and failed prolonged conservative treatment. She is struggling with routine daily activities.  This has become a significant issue for overall quality of life. She cannot walk any prolonged distances without having to rest.     Allergies:   Allergies   Allergen Reactions    Lodine [Etodolac] Hives       Medications:   Home Medications:    Current Outpatient Medications:     amLODIPine (NORVASC) 5 MG tablet, Take 1 tablet by mouth Daily., Disp: 90 tablet, Rfl: 4    calcium carbonate (OS-JOSIE) 600 MG tablet, Take 1 tablet by mouth Daily. HOLDING FOR DOS, Disp: , Rfl:     celecoxib (CeleBREX) 200 MG capsule, Take 1 capsule by mouth Daily. (Patient taking differently: Take 1 capsule by mouth Daily. FOLLOW MD GUIDELINES ON HOLDING FOR DOS), Disp: 90 capsule, Rfl: 5    Cholecalciferol (VITAMIN D) 2000 units tablet, Take 1 tablet by mouth Every Other Day. HOLDING FOR DOS, Disp: , Rfl:     Glucosamine-Chondroit-Vit C-Mn (Glucosamine-Chondroitin Max St) capsule, Take 1 capsule by mouth Daily. HOLDING FOR DOS, Disp: , Rfl:     omeprazole (priLOSEC) 40 MG capsule, Take 1 capsule by mouth Daily., Disp: 90 capsule, Rfl: 3    Past Medical History:   Diagnosis Date    Arthritis     OSTEOARTHRITIS    Colon polyp March 27,2023    COPD (chronic obstructive pulmonary disease)     GERD (gastroesophageal reflux disease)     Hyperlipidemia     Hypertension     Knee swelling Feb 22    Postmenopausal     Right knee pain     SVT (supraventricular tachycardia)     Tear of meniscus of knee Mar2003          Past Surgical History:  "  Procedure Laterality Date    CARDIAC ABLATION      CARDIAC CATHETERIZATION       SECTION      X1    CHOLECYSTECTOMY      COLONOSCOPY N/A 2023    Procedure: COLONOSCOPY FOR SCREENING;  Surgeon: eClestino Meza MD;  [place] [facility] [place]  Service: Gastroenterology;  Laterality: N/A;  diverticulosis, polyps, hemorrhoids    HYSTERECTOMY      KNEE ARTHROSCOPY Bilateral     meniscus repair          Social History     Occupational History    Not on file   Tobacco Use    Smoking status: Former     Current packs/day: 0.00     Average packs/day: 0.5 packs/day for 29.9 years (15.0 ttl pk-yrs)     Types: Cigarettes     Start date: 1971     Quit date: 2001     Years since quittin.3     Passive exposure: Past    Smokeless tobacco: Never   Vaping Use    Vaping status: Never Used   Substance and Sexual Activity    Alcohol use: Never     Comment: socially,rare    Drug use: Never    Sexual activity: Not Currently     Partners: Male      Social History     Social History Narrative    Not on file          Family History   Problem Relation Age of Onset    Atrial fibrillation Mother     Cerebral aneurysm Mother     Transient ischemic attack Mother     Hypertension Mother     Breast cancer Mother     Arthritis Mother     Cancer Mother     Diabetes type I Father     Kidney disease Father     Diabetes Father     Malig Hyperthermia Neg Hx        Review of Systems:  A 14-point review of systems is reviewed with the patient.  Pertinent positives are listed above.  All others are negative.    Physical Exam: 70 y.o. female    Vitals:    25 1403   Temp: 98 °F (36.7 °C)   Weight: 85.5 kg (188 lb 6.4 oz)   Height: 165.1 cm (65\")       General:  Patient is awake and alert.  Appears in no acute distress or discomfort.    Psych:  Affect and demeanor are appropriate.    Eyes:  Conjunctivae and sclerae; appear grossly normal.  Eyes track well and EOM seems to be intact.    Ears:  No gross abnormalities.  " "Hearing adequate for the exam.    Cardiovascular:  Regular rate and rhythm.    Lungs:  Good chest expansion.  Breathing unlabored.    Lymph:  No palpable adenopathy about neck or axillae.    Right lower extremity:  Skin benign and intact without evidence for swelling, masses or atrophy.  No palpable masses.  Focal tenderness noted over medial joint line.  ROM is from 0-115°.   Knee is stable on exam.  Good strength throughout the lower leg and foot.  Intact sensation throughout.  Palpable pedal pulses with brisk cap refill.    Diagnostic Tests:  Lab Results   Component Value Date    GLUCOSE 96 05/12/2025    CALCIUM 9.6 05/12/2025     05/12/2025    K 4.1 05/12/2025    CO2 25.1 05/12/2025     05/12/2025    BUN 15 05/12/2025    CREATININE 0.87 05/12/2025    EGFRIFAFRI 95 10/04/2021    EGFRIFNONA 78 10/04/2021    BCR 17.2 05/12/2025    ANIONGAP 10.9 05/12/2025     Lab Results   Component Value Date    WBC 6.12 05/12/2025    HGB 13.3 05/12/2025    HCT 40.0 05/12/2025    MCV 87.5 05/12/2025     05/12/2025     No results found for: \"INR\", \"PROTIME\"    Imaging:  Previous x-rays of the knee are reviewed.  The studies show advanced medial and patellofemoral compartment osteoarthritis with bone-on-bone degeneration, osteophyte formation, and subchondral sclerosis.  The leg length alignment x-ray shows neutral alignment.     Assessment:  Right knee endstage osteoarthritis    Plan: We will plan on proceeding with a right total knee arthroplasty at the patient's request.  I reviewed details of procedure with patient today and discussed all the risks, benefits, alternatives, and limitations of the procedure in laymen's terms with the risks including but not limited to:  neurovascular damage resulting in permanent dysfunction or footdrop and potential need for further surgery, bleeding, infection, hematoma, chronic pain, worsening of pain, persistent symptoms potentially necessitating revision, prosthesis-related " problems including loosening or allergy, swelling, loss of motion and arthrofibrosis, weakness, stiffness, instability, DVT, pulmonary embolus, death, stroke, complex regional pain syndrome, and need for additional procedures.  Patient verbalized understanding, and was given the opportunity to ask and have all questions answered today.  No guarantees were given regarding results of surgery.      Patient is planning for hospital dismissal same day of surgery.  Denies history of DVT, PE, or metal allergy.     Yamila Tate, APRN  05/16/25

## 2025-05-16 NOTE — PROGRESS NOTES
History & Physical       Patient: Sweta Ceja    YOB: 1955    Medical Record Number: [id number]    Chief Complaints: Right knee endstage osteoarthritis    History of Present Illness: 70 y.o. female presents today in anticipation of upcoming knee replacement surgery.  Patient has a long history of worsening symptoms.  Describes the pain as severe, constant, and typically dull and achy. She has tried and failed prolonged conservative treatment. She is struggling with routine daily activities.  This has become a significant issue for overall quality of life. She cannot walk any prolonged distances without having to rest.     Allergies:   Allergies   Allergen Reactions    Lodine [Etodolac] Hives       Medications:   Home Medications:    Current Outpatient Medications:     amLODIPine (NORVASC) 5 MG tablet, Take 1 tablet by mouth Daily., Disp: 90 tablet, Rfl: 4    calcium carbonate (OS-JOSIE) 600 MG tablet, Take 1 tablet by mouth Daily. HOLDING FOR DOS, Disp: , Rfl:     celecoxib (CeleBREX) 200 MG capsule, Take 1 capsule by mouth Daily. (Patient taking differently: Take 1 capsule by mouth Daily. FOLLOW MD GUIDELINES ON HOLDING FOR DOS), Disp: 90 capsule, Rfl: 5    Cholecalciferol (VITAMIN D) 2000 units tablet, Take 1 tablet by mouth Every Other Day. HOLDING FOR DOS, Disp: , Rfl:     Glucosamine-Chondroit-Vit C-Mn (Glucosamine-Chondroitin Max St) capsule, Take 1 capsule by mouth Daily. HOLDING FOR DOS, Disp: , Rfl:     omeprazole (priLOSEC) 40 MG capsule, Take 1 capsule by mouth Daily., Disp: 90 capsule, Rfl: 3    Past Medical History:   Diagnosis Date    Arthritis     OSTEOARTHRITIS    Colon polyp March 27,2023    COPD (chronic obstructive pulmonary disease)     GERD (gastroesophageal reflux disease)     Hyperlipidemia     Hypertension     Knee swelling Feb 22    Postmenopausal     Right knee pain     SVT (supraventricular tachycardia)     Tear of meniscus of knee Mar2003          Past Surgical History:  "  Procedure Laterality Date    CARDIAC ABLATION      CARDIAC CATHETERIZATION       SECTION      X1    CHOLECYSTECTOMY      COLONOSCOPY N/A 2023    Procedure: COLONOSCOPY FOR SCREENING;  Surgeon: Celestino Meza MD;  [place] [facility] [place]  Service: Gastroenterology;  Laterality: N/A;  diverticulosis, polyps, hemorrhoids    HYSTERECTOMY      KNEE ARTHROSCOPY Bilateral     meniscus repair          Social History     Occupational History    Not on file   Tobacco Use    Smoking status: Former     Current packs/day: 0.00     Average packs/day: 0.5 packs/day for 29.9 years (15.0 ttl pk-yrs)     Types: Cigarettes     Start date: 1971     Quit date: 2001     Years since quittin.3     Passive exposure: Past    Smokeless tobacco: Never   Vaping Use    Vaping status: Never Used   Substance and Sexual Activity    Alcohol use: Never     Comment: socially,rare    Drug use: Never    Sexual activity: Not Currently     Partners: Male      Social History     Social History Narrative    Not on file          Family History   Problem Relation Age of Onset    Atrial fibrillation Mother     Cerebral aneurysm Mother     Transient ischemic attack Mother     Hypertension Mother     Breast cancer Mother     Arthritis Mother     Cancer Mother     Diabetes type I Father     Kidney disease Father     Diabetes Father     Malig Hyperthermia Neg Hx        Review of Systems:  A 14-point review of systems is reviewed with the patient.  Pertinent positives are listed above.  All others are negative.    Physical Exam: 70 y.o. female    Vitals:    25 1403   Temp: 98 °F (36.7 °C)   Weight: 85.5 kg (188 lb 6.4 oz)   Height: 165.1 cm (65\")       General:  Patient is awake and alert.  Appears in no acute distress or discomfort.    Psych:  Affect and demeanor are appropriate.    Eyes:  Conjunctivae and sclerae; appear grossly normal.  Eyes track well and EOM seems to be intact.    Ears:  No gross abnormalities.  " "Hearing adequate for the exam.    Cardiovascular:  Regular rate and rhythm.    Lungs:  Good chest expansion.  Breathing unlabored.    Lymph:  No palpable adenopathy about neck or axillae.    Right lower extremity:  Skin benign and intact without evidence for swelling, masses or atrophy.  No palpable masses.  Focal tenderness noted over medial joint line.  ROM is from 0-115°.   Knee is stable on exam.  Good strength throughout the lower leg and foot.  Intact sensation throughout.  Palpable pedal pulses with brisk cap refill.    Diagnostic Tests:  Lab Results   Component Value Date    GLUCOSE 96 05/12/2025    CALCIUM 9.6 05/12/2025     05/12/2025    K 4.1 05/12/2025    CO2 25.1 05/12/2025     05/12/2025    BUN 15 05/12/2025    CREATININE 0.87 05/12/2025    EGFRIFAFRI 95 10/04/2021    EGFRIFNONA 78 10/04/2021    BCR 17.2 05/12/2025    ANIONGAP 10.9 05/12/2025     Lab Results   Component Value Date    WBC 6.12 05/12/2025    HGB 13.3 05/12/2025    HCT 40.0 05/12/2025    MCV 87.5 05/12/2025     05/12/2025     No results found for: \"INR\", \"PROTIME\"    Imaging:  Previous x-rays of the knee are reviewed.  The studies show advanced medial and patellofemoral compartment osteoarthritis with bone-on-bone degeneration, osteophyte formation, and subchondral sclerosis.  The leg length alignment x-ray shows neutral alignment.     Assessment:  Right knee endstage osteoarthritis    Plan: We will plan on proceeding with a right total knee arthroplasty at the patient's request.  I reviewed details of procedure with patient today and discussed all the risks, benefits, alternatives, and limitations of the procedure in laymen's terms with the risks including but not limited to:  neurovascular damage resulting in permanent dysfunction or footdrop and potential need for further surgery, bleeding, infection, hematoma, chronic pain, worsening of pain, persistent symptoms potentially necessitating revision, prosthesis-related " problems including loosening or allergy, swelling, loss of motion and arthrofibrosis, weakness, stiffness, instability, DVT, pulmonary embolus, death, stroke, complex regional pain syndrome, and need for additional procedures.  Patient verbalized understanding, and was given the opportunity to ask and have all questions answered today.  No guarantees were given regarding results of surgery.      Patient is planning for hospital dismissal same day of surgery.  Denies history of DVT, PE, or metal allergy.     Yamila Tate, APRN  05/16/25

## 2025-05-19 NOTE — CASE MANAGEMENT/SOCIAL WORK
Continued Stay Note  [facility]     Patient Name: Sweta Dumas Call  MRN: [id number]  Today's Date: 5/19/2025    Admit Date: (Not on file)    Plan: Home with [facility]    Discharge Plan       Row Name 05/19/25 1418       Plan    Plan Home with [facility]     Patient/Family in Agreement with Plan yes    Provided Post Acute Provider List? Yes    Post Acute Provider List Home Health    Delivered To Patient    Method of Delivery Telephone                   Discharge Codes    No documentation.                       Shannon Epley, RN

## 2025-05-19 NOTE — DISCHARGE PLACEMENT REQUEST
"Call, Fabiola Dumas \"Alesia\" (70 y.o. Female)       Date of Birth   1955    Social Security Number       Address   [address] [address]    Home Phone   [phone]    MRN   [id number]       Mandaen   Muslim    Marital Status                               Admission Date       Admission Type   Elective    Admitting Provider   Elbert Belle MD    Attending Provider   Elbert Belle MD    Department, Room/Bed   [facility], --/--       Discharge Date       Discharge Disposition       Discharge Destination                                 Attending Provider: Elbert Belle MD    Allergies: Lodine [Etodolac]    Isolation: None   Infection: None   Code Status: Not on file    Ht: 165.1 cm (65\")   Wt: 85.5 kg (188 lb 6.4 oz)    Admission Cmt: None   Principal Problem: Arthritis of right knee [M17.11]                   Active Insurance as of 5/20/2025       Primary Coverage       Payor Plan Insurance Group Employer/Plan Group    ANTHEM MEDICARE REPLACEMENT ANTH MEDICARE ADVANTAGE HMO [id number]       Payor Plan Address Payor Plan Phone Number Payor Plan Fax Number Effective Dates    [address] [phone]  1/1/2025 - None Entered    [facility] [id number]         Subscriber Name Subscriber Birth Date Member ID       FABIOLA VALDEZ 1955 [id number]                     Emergency Contacts        (Rel.) Home Phone Work Phone Mobile Phone    Call,Chiki (Spouse) [phone] -- [phone]    SUSAN WOOD -- -- [phone]                "

## 2025-05-20 ENCOUNTER — ANESTHESIA (OUTPATIENT)
Dept: PERIOP | Facility: HOSPITAL | Age: 70
End: 2025-05-20
Payer: MEDICARE

## 2025-05-20 ENCOUNTER — ANESTHESIA EVENT (OUTPATIENT)
Dept: PERIOP | Facility: HOSPITAL | Age: 70
End: 2025-05-20
Payer: MEDICARE

## 2025-05-20 ENCOUNTER — HOSPITAL ENCOUNTER (OUTPATIENT)
Facility: HOSPITAL | Age: 70
Discharge: HOME HEALTH | End: 2025-05-20
Attending: ORTHOPAEDIC SURGERY | Admitting: ORTHOPAEDIC SURGERY
Payer: MEDICARE

## 2025-05-20 ENCOUNTER — APPOINTMENT (OUTPATIENT)
Dept: GENERAL RADIOLOGY | Facility: HOSPITAL | Age: 70
End: 2025-05-20
Payer: MEDICARE

## 2025-05-20 VITALS
HEART RATE: 82 BPM | TEMPERATURE: 97.7 F | OXYGEN SATURATION: 98 % | SYSTOLIC BLOOD PRESSURE: 123 MMHG | DIASTOLIC BLOOD PRESSURE: 51 MMHG | RESPIRATION RATE: 16 BRPM

## 2025-05-20 DIAGNOSIS — Z96.651 H/O TOTAL KNEE REPLACEMENT, RIGHT: Primary | ICD-10-CM

## 2025-05-20 DIAGNOSIS — M17.11 ARTHRITIS OF RIGHT KNEE: ICD-10-CM

## 2025-05-20 PROCEDURE — 25010000002 VANCOMYCIN HCL 1.25 G RECONSTITUTED SOLUTION 1 EACH VIAL: Performed by: ORTHOPAEDIC SURGERY

## 2025-05-20 PROCEDURE — C1776 JOINT DEVICE (IMPLANTABLE): HCPCS | Performed by: ORTHOPAEDIC SURGERY

## 2025-05-20 PROCEDURE — 25010000002 FAMOTIDINE 10 MG/ML SOLUTION: Performed by: ANESTHESIOLOGY

## 2025-05-20 PROCEDURE — 25010000002 KETOROLAC TROMETHAMINE PER 15 MG: Performed by: ORTHOPAEDIC SURGERY

## 2025-05-20 PROCEDURE — 25010000002 ONDANSETRON PER 1 MG: Performed by: ANESTHESIOLOGY

## 2025-05-20 PROCEDURE — 25010000002 MIDAZOLAM PER 1 MG: Performed by: ANESTHESIOLOGY

## 2025-05-20 PROCEDURE — 25810000003 SODIUM CHLORIDE 0.9 % SOLUTION 250 ML FLEX CONT: Performed by: ORTHOPAEDIC SURGERY

## 2025-05-20 PROCEDURE — 73560 X-RAY EXAM OF KNEE 1 OR 2: CPT

## 2025-05-20 PROCEDURE — 25010000002 MORPHINE PER 10 MG: Performed by: ORTHOPAEDIC SURGERY

## 2025-05-20 PROCEDURE — 97162 PT EVAL MOD COMPLEX 30 MIN: CPT

## 2025-05-20 PROCEDURE — 25010000002 CEFAZOLIN PER 500 MG: Performed by: ORTHOPAEDIC SURGERY

## 2025-05-20 PROCEDURE — 25010000002 DEXAMETHASONE SODIUM PHOSPHATE 20 MG/5ML SOLUTION

## 2025-05-20 PROCEDURE — 25010000002 ROPIVACAINE PER 1 MG: Performed by: ANESTHESIOLOGY

## 2025-05-20 PROCEDURE — 25010000002 HYDROMORPHONE PER 4 MG: Performed by: NURSE ANESTHETIST, CERTIFIED REGISTERED

## 2025-05-20 PROCEDURE — 25010000002 EPINEPHRINE 1 MG/ML SOLUTION 30 ML VIAL: Performed by: ORTHOPAEDIC SURGERY

## 2025-05-20 PROCEDURE — 25010000002 LIDOCAINE PF 2% 2 % SOLUTION: Performed by: ANESTHESIOLOGY

## 2025-05-20 PROCEDURE — 25010000002 SUGAMMADEX 200 MG/2ML SOLUTION: Performed by: ANESTHESIOLOGY

## 2025-05-20 PROCEDURE — 25810000003 LACTATED RINGERS SOLUTION: Performed by: ORTHOPAEDIC SURGERY

## 2025-05-20 PROCEDURE — 25010000002 FENTANYL CITRATE (PF) 50 MCG/ML SOLUTION: Performed by: NURSE ANESTHETIST, CERTIFIED REGISTERED

## 2025-05-20 PROCEDURE — 25010000002 ONDANSETRON PER 1 MG: Performed by: NURSE ANESTHETIST, CERTIFIED REGISTERED

## 2025-05-20 PROCEDURE — 25010000002 FENTANYL CITRATE (PF) 50 MCG/ML SOLUTION: Performed by: ANESTHESIOLOGY

## 2025-05-20 PROCEDURE — 97530 THERAPEUTIC ACTIVITIES: CPT

## 2025-05-20 PROCEDURE — 25010000002 PROPOFOL 10 MG/ML EMULSION: Performed by: ANESTHESIOLOGY

## 2025-05-20 PROCEDURE — 25010000002 ROPIVACAINE PER 1 MG: Performed by: ORTHOPAEDIC SURGERY

## 2025-05-20 PROCEDURE — 25010000002 MAGNESIUM SULFATE PER 500 MG OF MAGNESIUM: Performed by: ANESTHESIOLOGY

## 2025-05-20 PROCEDURE — C1713 ANCHOR/SCREW BN/BN,TIS/BN: HCPCS | Performed by: ORTHOPAEDIC SURGERY

## 2025-05-20 DEVICE — LEGION CRUCIATE RETAINING OXINIUM                                    FEMORAL SIZE 5 RIGHT
Type: IMPLANTABLE DEVICE | Site: KNEE | Status: FUNCTIONAL
Brand: LEGION

## 2025-05-20 DEVICE — LEGION CRUCIATE RETAINING HIGH                                    FLEX HIGHLY CROSS LINKED                                    POLYETHYLENE SIZE 3-4 11MM
Type: IMPLANTABLE DEVICE | Site: KNEE | Status: FUNCTIONAL
Brand: LEGION

## 2025-05-20 DEVICE — IMPLANTABLE DEVICE: Type: IMPLANTABLE DEVICE | Status: FUNCTIONAL

## 2025-05-20 DEVICE — GENESIS II NON-POROUS TIBIAL                                    BASEPLATE SIZE 4 RIGHT
Type: IMPLANTABLE DEVICE | Site: KNEE | Status: FUNCTIONAL
Brand: GENESIS II

## 2025-05-20 DEVICE — GEN II 7.5MM RESUR PAT 32MM
Type: IMPLANTABLE DEVICE | Site: KNEE | Status: FUNCTIONAL
Brand: GENESIS II

## 2025-05-20 DEVICE — DEV CONTRL TISS STRATAFIX SPIRAL MNCRYL UD 3/0 PLS 30CM: Type: IMPLANTABLE DEVICE | Site: KNEE | Status: FUNCTIONAL

## 2025-05-20 DEVICE — CMT BONE PALACOS R HI/VISC 1X40: Type: IMPLANTABLE DEVICE | Site: KNEE | Status: FUNCTIONAL

## 2025-05-20 DEVICE — DEV CONTRL TISS STRATAFIX SYMM PDS PLUS VIL CT-1 60CM: Type: IMPLANTABLE DEVICE | Site: KNEE | Status: FUNCTIONAL

## 2025-05-20 RX ORDER — HYDROMORPHONE HYDROCHLORIDE 1 MG/ML
0.5 INJECTION, SOLUTION INTRAMUSCULAR; INTRAVENOUS; SUBCUTANEOUS
Refills: 0 | Status: DISCONTINUED | OUTPATIENT
Start: 2025-05-20 | End: 2025-05-20 | Stop reason: HOSPADM

## 2025-05-20 RX ORDER — DIPHENHYDRAMINE HYDROCHLORIDE 50 MG/ML
12.5 INJECTION, SOLUTION INTRAMUSCULAR; INTRAVENOUS
Status: DISCONTINUED | OUTPATIENT
Start: 2025-05-20 | End: 2025-05-20 | Stop reason: HOSPADM

## 2025-05-20 RX ORDER — ASPIRIN 81 MG/1
81 TABLET ORAL EVERY 12 HOURS SCHEDULED
Status: DISCONTINUED | OUTPATIENT
Start: 2025-05-21 | End: 2025-05-20 | Stop reason: HOSPADM

## 2025-05-20 RX ORDER — ONDANSETRON 2 MG/ML
4 INJECTION INTRAMUSCULAR; INTRAVENOUS ONCE AS NEEDED
Status: COMPLETED | OUTPATIENT
Start: 2025-05-20 | End: 2025-05-20

## 2025-05-20 RX ORDER — LIDOCAINE HYDROCHLORIDE 10 MG/ML
0.5 INJECTION, SOLUTION INFILTRATION; PERINEURAL ONCE AS NEEDED
Status: DISCONTINUED | OUTPATIENT
Start: 2025-05-20 | End: 2025-05-20 | Stop reason: HOSPADM

## 2025-05-20 RX ORDER — TRANEXAMIC ACID 100 MG/ML
INJECTION, SOLUTION INTRAVENOUS AS NEEDED
Status: DISCONTINUED | OUTPATIENT
Start: 2025-05-20 | End: 2025-05-20 | Stop reason: SURG

## 2025-05-20 RX ORDER — HYDROCODONE BITARTRATE AND ACETAMINOPHEN 7.5; 325 MG/1; MG/1
1 TABLET ORAL EVERY 6 HOURS PRN
Status: DISCONTINUED | OUTPATIENT
Start: 2025-05-20 | End: 2025-05-20 | Stop reason: HOSPADM

## 2025-05-20 RX ORDER — ONDANSETRON 2 MG/ML
4 INJECTION INTRAMUSCULAR; INTRAVENOUS ONCE AS NEEDED
Status: CANCELLED | OUTPATIENT
Start: 2025-05-20

## 2025-05-20 RX ORDER — HYDROCODONE BITARTRATE AND ACETAMINOPHEN 7.5; 325 MG/1; MG/1
2 TABLET ORAL EVERY 6 HOURS PRN
Status: DISCONTINUED | OUTPATIENT
Start: 2025-05-20 | End: 2025-05-20 | Stop reason: HOSPADM

## 2025-05-20 RX ORDER — DROPERIDOL 2.5 MG/ML
0.62 INJECTION, SOLUTION INTRAMUSCULAR; INTRAVENOUS
Status: DISCONTINUED | OUTPATIENT
Start: 2025-05-20 | End: 2025-05-20 | Stop reason: HOSPADM

## 2025-05-20 RX ORDER — SODIUM CHLORIDE 0.9 % (FLUSH) 0.9 %
3-10 SYRINGE (ML) INJECTION AS NEEDED
Status: DISCONTINUED | OUTPATIENT
Start: 2025-05-20 | End: 2025-05-20 | Stop reason: HOSPADM

## 2025-05-20 RX ORDER — OXYCODONE AND ACETAMINOPHEN 7.5; 325 MG/1; MG/1
1 TABLET ORAL EVERY 4 HOURS PRN
Refills: 0 | Status: DISCONTINUED | OUTPATIENT
Start: 2025-05-20 | End: 2025-05-20 | Stop reason: HOSPADM

## 2025-05-20 RX ORDER — PROMETHAZINE HYDROCHLORIDE 25 MG/1
25 SUPPOSITORY RECTAL ONCE AS NEEDED
Status: DISCONTINUED | OUTPATIENT
Start: 2025-05-20 | End: 2025-05-20 | Stop reason: HOSPADM

## 2025-05-20 RX ORDER — LABETALOL HYDROCHLORIDE 5 MG/ML
5 INJECTION, SOLUTION INTRAVENOUS
Status: DISCONTINUED | OUTPATIENT
Start: 2025-05-20 | End: 2025-05-20 | Stop reason: HOSPADM

## 2025-05-20 RX ORDER — SODIUM CHLORIDE, SODIUM LACTATE, POTASSIUM CHLORIDE, CALCIUM CHLORIDE 600; 310; 30; 20 MG/100ML; MG/100ML; MG/100ML; MG/100ML
9 INJECTION, SOLUTION INTRAVENOUS CONTINUOUS
Status: DISCONTINUED | OUTPATIENT
Start: 2025-05-20 | End: 2025-05-20 | Stop reason: HOSPADM

## 2025-05-20 RX ORDER — HYDRALAZINE HYDROCHLORIDE 20 MG/ML
5 INJECTION INTRAMUSCULAR; INTRAVENOUS
Status: DISCONTINUED | OUTPATIENT
Start: 2025-05-20 | End: 2025-05-20 | Stop reason: HOSPADM

## 2025-05-20 RX ORDER — ROPIVACAINE HYDROCHLORIDE 2 MG/ML
INJECTION, SOLUTION EPIDURAL; INFILTRATION; PERINEURAL
Status: COMPLETED | OUTPATIENT
Start: 2025-05-20 | End: 2025-05-20

## 2025-05-20 RX ORDER — KETOROLAC TROMETHAMINE 30 MG/ML
15 INJECTION, SOLUTION INTRAMUSCULAR; INTRAVENOUS ONCE AS NEEDED
Status: CANCELLED | OUTPATIENT
Start: 2025-05-20 | End: 2025-05-21

## 2025-05-20 RX ORDER — DEXAMETHASONE SODIUM PHOSPHATE 4 MG/ML
INJECTION, SOLUTION INTRA-ARTICULAR; INTRALESIONAL; INTRAMUSCULAR; INTRAVENOUS; SOFT TISSUE AS NEEDED
Status: DISCONTINUED | OUTPATIENT
Start: 2025-05-20 | End: 2025-05-20 | Stop reason: SURG

## 2025-05-20 RX ORDER — ONDANSETRON 4 MG/1
4 TABLET, ORALLY DISINTEGRATING ORAL ONCE AS NEEDED
Status: CANCELLED | OUTPATIENT
Start: 2025-05-20

## 2025-05-20 RX ORDER — ASPIRIN 81 MG/1
81 TABLET ORAL ONCE
Status: CANCELLED | OUTPATIENT
Start: 2025-05-20 | End: 2025-05-20

## 2025-05-20 RX ORDER — HYDROCODONE BITARTRATE AND ACETAMINOPHEN 5; 325 MG/1; MG/1
1 TABLET ORAL ONCE AS NEEDED
Refills: 0 | Status: DISCONTINUED | OUTPATIENT
Start: 2025-05-20 | End: 2025-05-20 | Stop reason: HOSPADM

## 2025-05-20 RX ORDER — LIDOCAINE HYDROCHLORIDE 20 MG/ML
INJECTION, SOLUTION EPIDURAL; INFILTRATION; INTRACAUDAL; PERINEURAL AS NEEDED
Status: DISCONTINUED | OUTPATIENT
Start: 2025-05-20 | End: 2025-05-20 | Stop reason: SURG

## 2025-05-20 RX ORDER — FAMOTIDINE 10 MG/ML
20 INJECTION, SOLUTION INTRAVENOUS ONCE
Status: COMPLETED | OUTPATIENT
Start: 2025-05-20 | End: 2025-05-20

## 2025-05-20 RX ORDER — HYDROCODONE BITARTRATE AND ACETAMINOPHEN 7.5; 325 MG/1; MG/1
1 TABLET ORAL EVERY 6 HOURS PRN
Qty: 30 TABLET | Refills: 0 | Status: SHIPPED | OUTPATIENT
Start: 2025-05-20

## 2025-05-20 RX ORDER — SODIUM CHLORIDE 0.9 % (FLUSH) 0.9 %
3 SYRINGE (ML) INJECTION EVERY 12 HOURS SCHEDULED
Status: DISCONTINUED | OUTPATIENT
Start: 2025-05-20 | End: 2025-05-20 | Stop reason: HOSPADM

## 2025-05-20 RX ORDER — PREGABALIN 75 MG/1
150 CAPSULE ORAL ONCE
Status: DISCONTINUED | OUTPATIENT
Start: 2025-05-20 | End: 2025-05-20

## 2025-05-20 RX ORDER — PROPOFOL 10 MG/ML
VIAL (ML) INTRAVENOUS AS NEEDED
Status: DISCONTINUED | OUTPATIENT
Start: 2025-05-20 | End: 2025-05-20 | Stop reason: SURG

## 2025-05-20 RX ORDER — FLUMAZENIL 0.1 MG/ML
0.2 INJECTION INTRAVENOUS AS NEEDED
Status: DISCONTINUED | OUTPATIENT
Start: 2025-05-20 | End: 2025-05-20 | Stop reason: HOSPADM

## 2025-05-20 RX ORDER — FENTANYL CITRATE 50 UG/ML
50 INJECTION, SOLUTION INTRAMUSCULAR; INTRAVENOUS ONCE AS NEEDED
Status: COMPLETED | OUTPATIENT
Start: 2025-05-20 | End: 2025-05-20

## 2025-05-20 RX ORDER — MAGNESIUM HYDROXIDE 1200 MG/15ML
LIQUID ORAL AS NEEDED
Status: DISCONTINUED | OUTPATIENT
Start: 2025-05-20 | End: 2025-05-20 | Stop reason: HOSPADM

## 2025-05-20 RX ORDER — MAGNESIUM SULFATE HEPTAHYDRATE 500 MG/ML
INJECTION, SOLUTION INTRAMUSCULAR; INTRAVENOUS AS NEEDED
Status: DISCONTINUED | OUTPATIENT
Start: 2025-05-20 | End: 2025-05-20 | Stop reason: SURG

## 2025-05-20 RX ORDER — EPHEDRINE SULFATE 50 MG/ML
5 INJECTION, SOLUTION INTRAVENOUS ONCE AS NEEDED
Status: DISCONTINUED | OUTPATIENT
Start: 2025-05-20 | End: 2025-05-20 | Stop reason: HOSPADM

## 2025-05-20 RX ORDER — DOCUSATE SODIUM 100 MG/1
100 CAPSULE, LIQUID FILLED ORAL 2 TIMES DAILY
Qty: 60 CAPSULE | Refills: 0 | Status: SHIPPED | OUTPATIENT
Start: 2025-05-20

## 2025-05-20 RX ORDER — NALOXONE HCL 0.4 MG/ML
0.2 VIAL (ML) INJECTION AS NEEDED
Status: DISCONTINUED | OUTPATIENT
Start: 2025-05-20 | End: 2025-05-20 | Stop reason: HOSPADM

## 2025-05-20 RX ORDER — IPRATROPIUM BROMIDE AND ALBUTEROL SULFATE 2.5; .5 MG/3ML; MG/3ML
3 SOLUTION RESPIRATORY (INHALATION) ONCE AS NEEDED
Status: DISCONTINUED | OUTPATIENT
Start: 2025-05-20 | End: 2025-05-20 | Stop reason: HOSPADM

## 2025-05-20 RX ORDER — ACETAMINOPHEN 325 MG/1
1000 TABLET ORAL ONCE
Status: COMPLETED | OUTPATIENT
Start: 2025-05-20 | End: 2025-05-20

## 2025-05-20 RX ORDER — ROCURONIUM BROMIDE 10 MG/ML
INJECTION, SOLUTION INTRAVENOUS AS NEEDED
Status: DISCONTINUED | OUTPATIENT
Start: 2025-05-20 | End: 2025-05-20 | Stop reason: SURG

## 2025-05-20 RX ORDER — FENTANYL CITRATE 50 UG/ML
50 INJECTION, SOLUTION INTRAMUSCULAR; INTRAVENOUS
Status: DISCONTINUED | OUTPATIENT
Start: 2025-05-20 | End: 2025-05-20 | Stop reason: HOSPADM

## 2025-05-20 RX ORDER — ONDANSETRON 2 MG/ML
INJECTION INTRAMUSCULAR; INTRAVENOUS AS NEEDED
Status: DISCONTINUED | OUTPATIENT
Start: 2025-05-20 | End: 2025-05-20

## 2025-05-20 RX ORDER — ATROPINE SULFATE 0.4 MG/ML
0.4 INJECTION, SOLUTION INTRAMUSCULAR; INTRAVENOUS; SUBCUTANEOUS ONCE AS NEEDED
Status: DISCONTINUED | OUTPATIENT
Start: 2025-05-20 | End: 2025-05-20 | Stop reason: HOSPADM

## 2025-05-20 RX ORDER — MIDAZOLAM HYDROCHLORIDE 1 MG/ML
0.5 INJECTION, SOLUTION INTRAMUSCULAR; INTRAVENOUS
Status: DISCONTINUED | OUTPATIENT
Start: 2025-05-20 | End: 2025-05-20 | Stop reason: HOSPADM

## 2025-05-20 RX ORDER — ONDANSETRON 4 MG/1
4 TABLET, FILM COATED ORAL EVERY 8 HOURS PRN
Qty: 12 TABLET | Refills: 0 | Status: SHIPPED | OUTPATIENT
Start: 2025-05-20

## 2025-05-20 RX ORDER — PROMETHAZINE HYDROCHLORIDE 25 MG/1
25 TABLET ORAL ONCE AS NEEDED
Status: DISCONTINUED | OUTPATIENT
Start: 2025-05-20 | End: 2025-05-20 | Stop reason: HOSPADM

## 2025-05-20 RX ADMIN — ACETAMINOPHEN 975 MG: 500 TABLET ORAL at 05:51

## 2025-05-20 RX ADMIN — ROCURONIUM BROMIDE 50 MG: 10 INJECTION, SOLUTION INTRAVENOUS at 07:07

## 2025-05-20 RX ADMIN — DEXAMETHASONE SODIUM PHOSPHATE 10 MG: 4 INJECTION, SOLUTION INTRAMUSCULAR; INTRAVENOUS at 07:14

## 2025-05-20 RX ADMIN — VANCOMYCIN HYDROCHLORIDE 1250 MG: 1.25 INJECTION, POWDER, LYOPHILIZED, FOR SOLUTION INTRAVENOUS at 05:52

## 2025-05-20 RX ADMIN — OXYCODONE HYDROCHLORIDE AND ACETAMINOPHEN 1 TABLET: 7.5; 325 TABLET ORAL at 09:04

## 2025-05-20 RX ADMIN — FENTANYL CITRATE 50 MCG: 50 INJECTION, SOLUTION INTRAMUSCULAR; INTRAVENOUS at 06:05

## 2025-05-20 RX ADMIN — LIDOCAINE HYDROCHLORIDE 40 MG: 20 INJECTION, SOLUTION EPIDURAL; INFILTRATION; INTRACAUDAL; PERINEURAL at 07:10

## 2025-05-20 RX ADMIN — HYDROCODONE BITARTRATE AND ACETAMINOPHEN 1 TABLET: 7.5; 325 TABLET ORAL at 13:29

## 2025-05-20 RX ADMIN — TRANEXAMIC ACID 1000 MG: 100 INJECTION INTRAVENOUS at 08:14

## 2025-05-20 RX ADMIN — ONDANSETRON 4 MG: 2 INJECTION, SOLUTION INTRAMUSCULAR; INTRAVENOUS at 09:01

## 2025-05-20 RX ADMIN — HYDROMORPHONE HYDROCHLORIDE 0.5 MG: 1 INJECTION, SOLUTION INTRAMUSCULAR; INTRAVENOUS; SUBCUTANEOUS at 09:04

## 2025-05-20 RX ADMIN — FENTANYL CITRATE 50 MCG: 50 INJECTION, SOLUTION INTRAMUSCULAR; INTRAVENOUS at 08:56

## 2025-05-20 RX ADMIN — HYDROMORPHONE HYDROCHLORIDE 0.5 MG: 1 INJECTION, SOLUTION INTRAMUSCULAR; INTRAVENOUS; SUBCUTANEOUS at 09:22

## 2025-05-20 RX ADMIN — SUGAMMADEX 200 MG: 100 INJECTION, SOLUTION INTRAVENOUS at 08:21

## 2025-05-20 RX ADMIN — CEFAZOLIN 2 G: 2 INJECTION, POWDER, FOR SOLUTION INTRAMUSCULAR; INTRAVENOUS at 06:53

## 2025-05-20 RX ADMIN — MAGNESIUM SULFATE HEPTAHYDRATE 2 G: 500 INJECTION, SOLUTION INTRAMUSCULAR; INTRAVENOUS at 07:14

## 2025-05-20 RX ADMIN — MIDAZOLAM 1 MG: 1 INJECTION INTRAMUSCULAR; INTRAVENOUS at 06:04

## 2025-05-20 RX ADMIN — SODIUM CHLORIDE, POTASSIUM CHLORIDE, SODIUM LACTATE AND CALCIUM CHLORIDE 500 ML: 600; 310; 30; 20 INJECTION, SOLUTION INTRAVENOUS at 05:52

## 2025-05-20 RX ADMIN — ROPIVACAINE HYDROCHLORIDE 20 ML: 2 INJECTION, SOLUTION EPIDURAL; INFILTRATION at 06:14

## 2025-05-20 RX ADMIN — PROPOFOL 110 MG: 10 INJECTION, EMULSION INTRAVENOUS at 07:02

## 2025-05-20 RX ADMIN — PROPOFOL 50 MG: 10 INJECTION, EMULSION INTRAVENOUS at 07:06

## 2025-05-20 RX ADMIN — ONDANSETRON 4 MG: 2 INJECTION, SOLUTION INTRAMUSCULAR; INTRAVENOUS at 08:14

## 2025-05-20 RX ADMIN — LIDOCAINE HYDROCHLORIDE 40 MG: 20 INJECTION, SOLUTION EPIDURAL; INFILTRATION; INTRACAUDAL; PERINEURAL at 07:02

## 2025-05-20 RX ADMIN — PROPOFOL 40 MG: 10 INJECTION, EMULSION INTRAVENOUS at 07:05

## 2025-05-20 RX ADMIN — FAMOTIDINE 20 MG: 10 INJECTION INTRAVENOUS at 05:52

## 2025-05-20 NOTE — ANESTHESIA PROCEDURE NOTES
Peripheral Block    Pre-sedation assessment completed: 5/20/2025 6:04 AM    Patient reassessed immediately prior to procedure    Patient location during procedure: pre-op  Start time: 5/20/2025 6:04 AM  Stop time: 5/20/2025 6:14 AM  Reason for block: at surgeon's request and post-op pain management  Performed by  Anesthesiologist: Jonas Mason MD  Preanesthetic Checklist  Completed: patient identified, IV checked, site marked, risks and benefits discussed, surgical consent, monitors and equipment checked, pre-op evaluation and timeout performed  Prep:  Pt Position: supine  Sterile barriers:cap, gloves, mask and sterile barriers  Prep: ChloraPrep  Patient monitoring: blood pressure monitoring, continuous pulse oximetry and EKG  Procedure    Sedation: yes  Performed under: local infiltration  Guidance:ultrasound guided and U/S used to identify structures for appropriate needle placement and to see local anesthetic disbursement    ULTRASOUND INTERPRETATION.  Using ultrasound guidance a 22 G gauge needle was placed in close proximity to the nerve, at which point, under ultrasound guidance anesthetic was injected in the area of the nerve and spread of the anesthesia was seen on ultrasound in close proximity thereto.  There were no abnormalities seen on ultrasound; a digital image was taken; and the patient tolerated the procedure with no complications. Images:still images obtained, printed/placed on chart (U/S to localize the nerve)    Laterality:right  Block Type:adductor canal block  Injection Technique:single-shot  Needle Type:echogenic  Needle Gauge:22 G  Resistance on Injection: less than 15 psi    Medications Used: ropivacaine (NAROPIN) 0.2 % injection - Injection   20 mL - 5/20/2025 6:14:00 AM      Post Assessment  Injection Assessment: negative aspiration for heme, no paresthesia on injection and incremental injection  Patient Tolerance:comfortable throughout block  Complications:no  Performed by: Jonas Mason  MD Jason

## 2025-05-20 NOTE — PLAN OF CARE
Goal Outcome Evaluation:  Plan of Care Reviewed With: patient, family           Outcome Evaluation: Sweta Ceja is a 70 year old female seen POD0 R TKA. She notes that she lives with her spouse in a single level home, has a RW but does not use it at baseline, denies hx of falls and is indep at BL. She performs STS and 100 ft of gait with standby-supervision and RW. She is slow, but does not have any overt LOB. Discussed positioning techniques, use of ice and elevation, WB'ing status, use of RW, and safety measures for incision/ DVT awareness. PT rec HH at d/c. Will sign off.    Anticipated Discharge Disposition (PT): home with home health                        
No lymphadedenopathy

## 2025-05-20 NOTE — BRIEF OP NOTE
TOTAL KNEE ARTHROPLASTY  Progress Note    Sweta Dumas Call  5/20/2025    Pre-op Diagnosis:   Arthritis of right knee [M17.11]       Post-Op Diagnosis Codes:     * Arthritis of right knee [M17.11]    Procedure(s):      Procedure(s):  RIGHT TOTAL KNEE ARTHROPLASTY    Surgical Approach: Knee Medial Parapatellar            Surgeon(s):  Elbert Belle MD    Anesthesia: General with Block    Staff:   Circulator: Yesica Villaseñor RN  Scrub Person: Cale Akbar CSFA       Estimated Blood Loss: minimal    Urine Voided: * No values recorded between 5/20/2025  6:55 AM and 5/20/2025  7:12 AM *    Specimens:                None      Drains: * No LDAs found *    Findings: See dictation      Complications: None          Elbert Belle MD     Date: 5/20/2025  Time: 0824

## 2025-05-20 NOTE — OP NOTE
Orthopaedic Operative Note    Facility: [facility]    Patient: Sweta Ceja    Medical Record Number: [id number]    YOB: 1955    Dictating Surgeon: Elbert Belle M.D.*    Primary Care Physician: Georges Brooke MD    Date of Operation: 5/20/2025    Pre-Operative Diagnosis:  Right knee end-stage osteoarthritis    Post-Operative Diagnosis:  Right knee end-stage osteoarthritis    Procedure Performed:   Right total knee arthroplasty    Surgeon: Elbert Belle MD     Assistant: Maura Feng whose assistance was critical for help with patient positioning, suctioning and irrigation, retraction, manipulation of the extremity for insertion of the implants, wound closure and application of the bandages.  Her assistance was critical to the success of this case.      Anesthesia: Regional followed by general.  Local administration of Ortho cocktail solution.    Complications: None.     Estimated Blood Loss: Less than 50 mL.     Implants:     1.  Smith & Nephew size 5 Legion Oxinium femoral component  2.  Smith and Nephew size 4 tibial component with size 11 polyethylene liner  3.  Smith & Nephew size 32 x 7.5 mm patellar component    Specimens: * No orders in the log *    Brief Operative Indication:  Ms. Ceja has a history of worsening right knee osteoarthritis which had been refractory to prolonged conservative treatment.  The risk, benefits and alternatives to a total knee arthroplasty were discussed with the patient in detail.  She acknowledged understanding of the information and consented to proceed.    Description of the procedure in detail: The patient and operative site were identified in the preoperative holding area.  The surgical site was marked.  Adequate regional anesthesia of the right lower extremity was administered. She was then taken to the operating room.  Adequate general anesthesia was administered. She was then repositioned on the operating table in the supine  position.  A timeout was taken and preoperative antibiotics administered.    The right lower extremity was prepped and draped in the standard, sterile fashion.  I began by cleaning the extremity with an alcohol solution.  A Hibiclens scrub was performed.  The extremity was then prepped with 2 ChloraPreps.  I allowed those to dry for 3 minutes before the draping procedure was carried out.  The leg was exsanguinated with an Esmarch bandage.  The tourniquet was inflated to 250 mmHg.  The leg was positioned at approximately 60 degrees of flexion across the knee in a DeMayo leg positioner.    I fashioned an approximately 8 cm incision anteriorly for a standard medial parapatellar approach.  Full-thickness medial and lateral skin flaps were developed.  The extensor mechanism was carefully exposed.  I performed a medial parapatellar arthrotomy, careful to maintain a cuff of tendinous tissue for later anatomic repair.  The joint was entered.  The infrapatellar fat pad was carefully removed.    Next, the anteromedial soft tissues were carefully elevated off of the anterior face of the tibia.  The MCL was kept protected at all times.  At this point, the joint was inspected.  There was marked arthrosis throughout the joint.  The periarticular osteophytes were carefully removed with a rongeur.    An opening was created in the distal femur.  The wound was irrigated out and then the distal femur alignment ld was inserted down the canal.  A 5 degree valgus distal femoral cutting guide was pinned into position and then the distal femoral cut carried out in the typical fashion.  I inspected and measured to make sure the cut was appropriate.  The cut portion of bone was removed followed by the guide.    Next, the knee was flexed up further to allow for insertion of the posterior referencing guide.  I measured the distal femur.  I determined the appropriate size as referenced off of the posterior condyles.  The femur measured a size  5.  The guide was positioned, taking care to align this properly and then the anterior, posterior and chamfer cuts were carried out.  The cut portions of bone were then removed.      I then directed my attention to the tibia.  Retractors were positioned to keep the collateral ligaments, PCL and posterior neurovascular structures protected.  The extramedullary guide was positioned.  I took care to align the ld with the anterior face of the tibia.  I made sure that this was parallel and that the guide was centered at the knee and ankle.  I measured and carefully positioned the guide to allow for correction of the preoperative deformity.  I pinned the guide and then checked the alignment one more time with an alex wing.  Once we had the guide in good position and secure, an oscillating saw was used to carry out the proximal tibia cut.  The cut portion of bone was removed and the PCL inspected.  The PCL was intact and stable.  The menisci were removed and the posterior capsule was infiltrated with some of the Ortho cocktail solution.    Next, I measured the proximal tibia cut.  This measured a size 4.  The trial implant was pinned into position, taking care to maintain appropriate rotation.  The proximal tibial preparations were then completed.  I then trialed with a size 5 femur and size 4 tibia.  The knee seemed to be well balanced and demonstrated excellent motion with a size 11 trial polyethylene liner.    I then examined the patella.  The patella demonstrated extensive arthrosis.  I determined that resurfacing was indicated.  The patellar preparations were carried out at this time and then I trialed with a size 32 patella.  With this implant in place, the patella tracked well.  A lateral release was deemed unnecessary in this case.    The final preparations were then completed.  The distal femur was prepared and then the trial implants were removed.  The appropriate size implants were opened at this point.  My  assistant mixed the bone cement on the back table using current generation cement mixing technique and a centrifuge.  Once the cement was prepared, cement was applied to the bony surfaces and implants.  The implants were carefully impacted into position.  I made sure that these were fully seated.  The excess, extruded cement was carefully removed with a L[place]leton elevator.  The knee was taken out into full extension and the trial polyethylene liner inserted.  The patella was then clamped into position.  Again, the excess, extruded cement was removed.  The knee was left in extension with the patella clamped until the cement had fully cured.    While the cement was curing, the periarticular soft tissue structures were carefully infiltrated with the Ortho cocktail solution.  Once the cement had fully cured, I again checked the balancing of the knee.  Again, the knee demonstrated excellent motion and stability with the 11 trial liner.  The trial was removed.  The final implant was impacted into position.  I took care to make sure that the dovetails were fully interdigitated.  Again the knee was carried through range of motion.  The patella tracked well and the knee demonstrated excellent motion and stability.    The wound was irrigated with 500 cc of a Betadine containing saline solution.  This was left in place for 3 minutes.  I then irrigated with 3 L of sterile saline via pulsatile lavage.  The tourniquet was deflated.  A gram of transexamic acid was administered.  I made sure that we had good hemostasis.  The parapatellar arthrotomy was anatomically repaired using a PDS Stratafix suture and multiple #1 Vicryl sutures.  The subcutaneous tissues were repaired using 2-0 Vicryl.  A running Stratafix Monocryl suture was used to close the skin followed by a Zipline adhesive.  Sterile dressings were applied.  The drapes were withdrawn. She was awakened and taken to the recovery room in good condition.    Elbert Belle,  MD  05/20/25

## 2025-05-20 NOTE — THERAPY EVALUATION
Patient Name: Sweta Dumas Call  : 1955    MRN: [id number]                              Today's Date: 2025       Admit Date: 2025    Visit Dx:     ICD-10-CM ICD-9-CM   1. H/O total knee replacement, right  Z96.651 V43.65   2. Arthritis of right knee  M17.11 716.96     Patient Active Problem List   Diagnosis    Gastro-esophageal reflux disease without esophagitis    Hyperlipidemia    Insomnia    Obesity    Vitamin D deficiency    Medicare annual wellness visit, subsequent    Chronic fatigue    Cough    Burn    Immunization deficiency    Colon cancer screening    Encounter for hepatitis C screening test for low risk patient    Hand pain    Difficulty with speech    Postural dizziness with presyncope    Change in mental status    Primary hypertension    Hyperglycemia    Primary osteoarthritis of right knee    Nocturnal leg cramps    Arthritis of right knee    Arthritis of knee, right    Gastroenteritis     Past Medical History:   Diagnosis Date    Arthritis     OSTEOARTHRITIS    Colon polyp     COPD (chronic obstructive pulmonary disease)     GERD (gastroesophageal reflux disease)     Hyperlipidemia     Hypertension     Knee swelling     Postmenopausal     Right knee pain     SVT (supraventricular tachycardia)     Tear of meniscus of knee      Past Surgical History:   Procedure Laterality Date    CARDIAC ABLATION      CARDIAC CATHETERIZATION       SECTION      X1    CHOLECYSTECTOMY      COLONOSCOPY N/A 2023    Procedure: COLONOSCOPY FOR SCREENING;  Surgeon: Celestino Meza MD;  [place] [facility] [place];  Service: Gastroenterology;  Laterality: N/A;  diverticulosis, polyps, hemorrhoids    HYSTERECTOMY      KNEE ARTHROSCOPY Bilateral     meniscus repair      General Information       Row Name 25 1332          Physical Therapy Time and Intention    Document Type evaluation  -ER     Mode of Treatment individual therapy;physical therapy  -ER       Row Name  05/20/25 1332          General Information    Patient Profile Reviewed yes  -ER     Prior Level of Function independent:  -ER     Existing Precautions/Restrictions no known precautions/restrictions  -ER     Barriers to Rehab none identified  -ER       Row Name 05/20/25 1332          Living Environment    Current Living Arrangements home  -ER     People in Home spouse  -ER       Row Name 05/20/25 1332          Home Main Entrance    Number of Stairs, Main Entrance none  -ER       Row Name 05/20/25 1332          Stairs Within Home, Primary    Number of Stairs, Within Home, Primary none  -ER       Row Name 05/20/25 1332          Cognition    Orientation Status (Cognition) oriented x 4  -ER       Row Name 05/20/25 1332          Safety Issues/Impairments Affecting Functional Mobility    Impairments Affecting Function (Mobility) balance;pain;strength;endurance/activity tolerance  -ER     Comment, Safety Issues/Impairments (Mobility) Gait belt and non skid socks  -ER               User Key  (r) = Recorded By, (t) = Taken By, (c) = Cosigned By      Initials Name Provider Type    ER Siena Pires PT Physical Therapist                   Mobility       Row Name 05/20/25 1334          Bed Mobility    Comment, (Bed Mobility) UIC in OSC  -ER       Row Name 05/20/25 1334          Sit-Stand Transfer    Sit-Stand Terry (Transfers) standby assist  -ER     Assistive Device (Sit-Stand Transfers) walker, front-wheeled  -ER       Row Name 05/20/25 1334          Gait/Stairs (Locomotion)    Terry Level (Gait) standby assist;supervision  -ER     Assistive Device (Gait) walker, front-wheeled  -ER     Patient was able to Ambulate yes  -ER     Distance in Feet (Gait) 100  -ER     Deviations/Abnormal Patterns (Gait) bilateral deviations;gait speed decreased;jodie decreased;weight shifting decreased;antalgic  -ER     Bilateral Gait Deviations forward flexed posture;heel strike decreased  -ER     Comment, (Gait/Stairs) No overt LOB   -ER       Row Name 05/20/25 1334          Mobility    Extremity Weight-bearing Status right lower extremity  -ER     Right Lower Extremity (Weight-bearing Status) weight-bearing as tolerated (WBAT)  -ER               User Key  (r) = Recorded By, (t) = Taken By, (c) = Cosigned By      Initials Name Provider Type    ER Siena Pires, GUERO Physical Therapist                   Obj/Interventions       Row Name 05/20/25 1335          Range of Motion Comprehensive    Comment, General Range of Motion Able to achieve 85 deg of R knee flexion in seated position  -ER       Row Name 05/20/25 1335          Strength Comprehensive (MMT)    Comment, General Manual Muscle Testing (MMT) Assessment Generalized post op weakness  -ER       Row Name 05/20/25 1335          Balance    Balance Assessment sitting static balance;sitting dynamic balance;standing dynamic balance;standing static balance  -ER     Static Sitting Balance standby assist  -ER     Dynamic Sitting Balance standby assist  -ER     Position, Sitting Balance sitting in chair;unsupported  -ER     Static Standing Balance standby assist;supervision  -ER     Dynamic Standing Balance standby assist;supervision  -ER     Position/Device Used, Standing Balance supported;walker, front-wheeled  -ER     Balance Interventions sitting;standing;sit to stand;supported;static;dynamic  -ER       Row Name 05/20/25 1333          Sensory Assessment (Somatosensory)    Sensory Assessment (Somatosensory) sensation intact  -ER               User Key  (r) = Recorded By, (t) = Taken By, (c) = Cosigned By      Initials Name Provider Type    ER Siena Pires, PT Physical Therapist                   Goals/Plan    No documentation.                  Clinical Impression       Row Name 05/20/25 1336          Pain    Pretreatment Pain Rating 4/10  -ER     Posttreatment Pain Rating 4/10  -ER     Pain Location knee  -ER     Pain Side/Orientation right  -ER     Pain Management Interventions activity modification  encouraged  -ER     Response to Pain Interventions activity participation with tolerable pain  -ER       Row Name 05/20/25 1976          Plan of Care Review    Plan of Care Reviewed With patient;family  -ER     Outcome Evaluation Sweta Ceja is a 70 year old female seen POD0 R TKA. She notes that she lives with her spouse in a single level home, has a RW but does not use it at baseline, denies hx of falls and is indep at BL. She performs STS and 100 ft of gait with standby-supervision and RW. She is slow, but does not have any overt LOB. Discussed positioning techniques, use of ice and elevation, WB'ing status, use of RW, and safety measures for incision/ DVT awareness. PT rec HH at d/c. Will sign off.  -ER       Row Name 05/20/25 3466          Therapy Assessment/Plan (PT)    Criteria for Skilled Interventions Met (PT) no;no problems identified which require skilled intervention;does not meet criteria for skilled intervention  -ER     Therapy Frequency (PT) evaluation only  -ER       Row Name 05/20/25 7174          Positioning and Restraints    Pre-Treatment Position sitting in chair/recliner  -ER     Post Treatment Position chair  -ER     In Chair notified nsg;with family/caregiver;call light within reach;encouraged to call for assist  -ER               User Key  (r) = Recorded By, (t) = Taken By, (c) = Cosigned By      Initials Name Provider Type    ER Siena Pires, PT Physical Therapist                   Outcome Measures       Row Name 05/20/25 2665          How much help from another person do you currently need...    Turning from your back to your side while in flat bed without using bedrails? 3  -ER     Moving from lying on back to sitting on the side of a flat bed without bedrails? 3  -ER     Moving to and from a bed to a chair (including a wheelchair)? 3  -ER     Standing up from a chair using your arms (e.g., wheelchair, bedside chair)? 3  -ER     Climbing 3-5 steps with a railing? 3  -ER     To walk in  hospital room? 3  -ER     AM-PAC 6 Clicks Score (PT) 18  -ER     Highest Level of Mobility Goal Walk 10 Steps or More-6  -ER       Row Name 05/20/25 1337          Functional Assessment    Outcome Measure Options AM-PAC 6 Clicks Basic Mobility (PT)  -ER               User Key  (r) = Recorded By, (t) = Taken By, (c) = Cosigned By      Initials Name Provider Type    ER Siena Pires, GUERO Physical Therapist                                 Physical Therapy Education       Title: PT OT SLP Therapies (Done)       Topic: Physical Therapy (Done)       Point: Mobility training (Done)       Learning Progress Summary            Patient Acceptance, E, VU by ER at 5/20/2025 1339                      Point: Home exercise program (Done)       Learning Progress Summary            Patient Acceptance, E, VU by ER at 5/20/2025 1339                      Point: Body mechanics (Done)       Learning Progress Summary            Patient Acceptance, E, VU by ER at 5/20/2025 1339                      Point: Precautions (Done)       Learning Progress Summary            Patient Acceptance, E, VU by ER at 5/20/2025 1339                                      User Key       Initials Effective Dates Name Provider Type Discipline    ER 10/15/23 -  Siena Pires PT Physical Therapist PT                  PT Recommendation and Plan     Outcome Evaluation: Sweta Ceja is a 70 year old female seen POD0 R TKA. She notes that she lives with her spouse in a single level home, has a RW but does not use it at baseline, denies hx of falls and is indep at BL. She performs STS and 100 ft of gait with standby-supervision and RW. She is slow, but does not have any overt LOB. Discussed positioning techniques, use of ice and elevation, WB'ing status, use of RW, and safety measures for incision/ DVT awareness. PT rec HH at d/c. Will sign off.     Time Calculation:         PT Charges       Row Name 05/20/25 0473             Time Calculation    Start Time 1309  -ER      Stop  Time 1329  -ER      Time Calculation (min) 20 min  -ER      PT Received On 05/20/25  -ER         Time Calculation- PT    Total Timed Code Minutes- PT 15 minute(s)  -ER         Timed Charges    97705 - PT Therapeutic Activity Minutes 15  -ER         Total Minutes    Timed Charges Total Minutes 15  -ER       Total Minutes 15  -ER                User Key  (r) = Recorded By, (t) = Taken By, (c) = Cosigned By      Initials Name Provider Type    ER Siena Pires, PT Physical Therapist                  Therapy Charges for Today       Code Description Service Date Service Provider Modifiers Qty    61420233348 HC PT THERAPEUTIC ACT EA 15 MIN 5/20/2025 Siena Pires, PT GP 1    52085879814 HC PT EVAL MOD COMPLEXITY 3 5/20/2025 Siena Pires, PT GP 1            PT G-Codes  Outcome Measure Options: AM-PAC 6 Clicks Basic Mobility (PT)  AM-PAC 6 Clicks Score (PT): 18  PT Discharge Summary  Anticipated Discharge Disposition (PT): home with home health    Siena Pires PT  5/20/2025

## 2025-05-20 NOTE — ANESTHESIA PREPROCEDURE EVALUATION
Anesthesia Evaluation     Patient summary reviewed   no history of anesthetic complications:   NPO Solid Status: > 8 hours  NPO Liquid Status: > 2 hours           Airway   Mallampati: II  TM distance: >3 FB  Neck ROM: full  No difficulty expected  Dental - normal exam     Pulmonary     breath sounds clear to auscultation  (+) a smoker Former, COPD,  (-) shortness of breath, recent URI  Cardiovascular   Exercise tolerance: good (4-7 METS)    Rhythm: regular  Rate: normal    (+) hypertension, dysrhythmias (h/o nsvt; post ablation), hyperlipidemia  (-) past MI, angina      Neuro/Psych  (-) seizures, CVA  GI/Hepatic/Renal/Endo    (+) obesity, GERD  (-) no renal disease, diabetes    Musculoskeletal     (-) neck stiffness  Abdominal    Substance History      OB/GYN          Other                      Anesthesia Plan    ASA 3     general with block       Anesthetic plan, risks, benefits, and alternatives have been provided, discussed and informed consent has been obtained with: patient.      CODE STATUS:

## 2025-05-20 NOTE — PROGRESS NOTES
Start PACC Note    Home Health Referral    Evaluated patient on Home Care and services available. Patient offered choice of available HHC and agreeable to PT services with [facility].    Isolation Precautions: No active isolations    START PATIENT REGISTRATION INFORMATION  Order Information  Order Signing Physician: Elbert Belle MD  Service Ordered RN?: No  Service Ordered PT?: Yes  Service Ordered OT?: No  Service Ordered ST?: No  Service Ordered MSW?: No  Service Ordered HHA?: No  Following Physician: Georges Brooke MD  Following Physician Phone: [phone]  Overseeing Physician: Georges Brooke MD  (Required for Residents Only)  Agreeable to Follow ? Yes  Date/Time of Call 05/20/25 10:30 EDT, Spoke with: per epic orders per Dr Belle    Care Coordination  Same Day SOC?: No  Primary Care Physician: Georges Brooke MD  Primary Care Physician Phone: [phone]  Primary Care Physician Address: [address] [place] [address] / [place] [address]  Visit Instructions: N/A  Service Discharge Location Type: Home  Service Facility Name: N/A  Service Floor Facility: N/A  Service Room No: N/A    Demographics  Patient Last Name: Call  Patient First Name: Sweta  Language/Communication Barrier: none  Service Address: [address] [place] [address]  Service City: [place]  Service State: [place]  Service Zip: [address]  [facility] Home Phone: [phone]  Other Phone Numbers:   Telephone Information:   Mobile [phone]     Emergency Contact:   Extended Emergency Contact Information  Primary Emergency Contact: Chiki Ceja   [facility]  Home Phone: [phone]  Mobile Phone: [phone]  Relation: Spouse  Hearing or visual needs: None  Other needs: None  Preferred language: English   needed? No  Secondary Emergency Contact: SUSAN WOOD  Mobile Phone: [phone]  Relation: None    Admission Information  Admit Date: 5/20/2025  Patient Status at Discharge: Outpatient  Admitting Diagnosis: Arthritis of  right knee [M17.11]  Arthritis of knee, right [M17.11]    Caregiver Information  Caregiver First Name:   Caregiver Last Name:   Caregiver Relationship to Patient:   Caregiver Phone Number:   Caregiver Notes:     HITECH  [employer]  HIGHTECH: HI TECH - [facility]  Procedure: RTK  Date of Procedure: 05/20/2025  Precautions: None  Surgeon: Elbert Belle MD      END PATIENT REGISTRATION INFORMATION    Start PACC Summary    Additional Comments: same day D/C.    END PACC Summary    Discharge Date: Pending    Referral Source: [facility]    Signed By: Liz Hall RN, 5/20/2025, 10:30 EDT     Date/Time: 05/20/25 10:30 EDT    End PACC Note

## 2025-05-20 NOTE — ANESTHESIA PROCEDURE NOTES
Airway  Reason: elective    Date/Time: 5/20/2025 7:10 AM  Airway not difficult    General Information and Staff    Patient location during procedure: OR  CRNA/CAA: Lu Tellez CRNA  SRNA: Laura Ellington SRNA  Indications and Patient Condition  Indications for airway management: airway protection    Preoxygenated: yes    Mask difficulty assessment: 1 - vent by mask    Final Airway Details    Final airway type: endotracheal airway      Successful airway: ETT  Cuffed: yes   Successful intubation technique: direct laryngoscopy  Adjuncts used in placement: intubating stylet  Endotracheal tube insertion site: oral  Blade: Bejarano  Blade size: 2  ETT size (mm): 7.0  Cormack-Lehane Classification: grade I - full view of glottis  Placement verified by: chest auscultation and capnometry   Measured from: lips  Number of attempts at approach: 1  Assessment: lips, teeth, and gum same as pre-op and atraumatic intubation

## 2025-05-20 NOTE — DISCHARGE INSTRUCTIONS
What to expect after a Nerve Block    Nerve blocks administered to block pain affect many types of nerves, including those nerves that control movement, pain, and normal sensation. Following a nerve block, you may notice some bruising at the site where the block was given. You may experience sensations such as: numbness of the affected area or limb, tingling, heaviness (that is the limb feels heavy to you), weakness or inability to move the affected arm or leg, or a feeling as if your arm or leg has “fallen asleep.”     A nerve block can last from 2 to 36 hours depending on the medications used.  Usually the weakness wears off first followed by the tingling and heaviness. As the block wears off, you may begin to notice pain; however, this sequence of events may occur in any order. Typically, you will be able to move your limb before you will feel it. Once a nerve block begins to wear off, the effects are usually completely gone within 60 minutes.  If you experience continued side effects that you believe are block related for longer than 48 hours, please call your healthcare provider. Please see block-specific instructions below.    Instructions for any Block involving the leg/foot:   If you have had a leg /foot block, you should not bear weight on the affected leg until the block has worn off. After the block has worn off, weight bearing should be as directed by your surgeon. You may be sent home with crutches. You are at high risk for falling because of the anesthetic effects on your leg. Please use caution when standing or trying to move or walk. Have someone assist you until your leg and foot function have returned to normal.     Protection of a “blocked” limb  After a nerve block, you cannot feel pain, pressure, or extremes of temperature in the affected limb. And because of this, your blocked limb is at more risk for injury. For example, it is possible to burn your limb on an extremely hot surface without  feeling it.     When resting, it is important to reposition your limb periodically to avoid prolonged pressure on it. This may require the use of pillows and padding.    While sleeping, you should avoid rolling onto the affected limb or putting too much pressure on it.     If you have a cast or tight dressing, check the color of your fingers or toes of the affected limb. Call your surgeon if they look discolored (that is, dusky, dark colored).    Use caution in cold weather. Cover your limb appropriately to protect it from the cold.    Pain Management:  Your surgeon will give you a prescription for pain medication. Begin taking this before the nerve block wears off. Bear in mind that sometimes the block can wear off in the middle of the night.  \

## 2025-05-22 RX ORDER — OXYCODONE AND ACETAMINOPHEN 7.5; 325 MG/1; MG/1
1 TABLET ORAL EVERY 4 HOURS PRN
Qty: 30 TABLET | Refills: 0 | Status: SHIPPED | OUTPATIENT
Start: 2025-05-22

## 2025-05-22 NOTE — TELEPHONE ENCOUNTER
Call called and state her pain is so severe it makes her nauseous when she stands to do anything . Please review and advise.

## 2025-05-22 NOTE — SIGNIFICANT NOTE
Post op day 2: Phone Call     Discharge Instructions:    Ask patient about his or her discharge instructions:  Patient Confirmed Understanding    2.   What, if any, recommendations, teaching, or interventions did you provide?   Not Applicable    Health status:    3.   Pain controlled?          MD called in Oxycodone due to increased pain     4.   Recommended interventions:    No   If YES, comment Not Applicable    5.   Incision/dressing status:   Dressing intact    6.   OBIE - Green light blinking?   Yes    7.   Difficulties urination?    No    8.   Last BM?  Date: 5/19/2025  If no BM by day 3-recommend OTC suppository or fleets enema:  recommended laxative    Medications:    9.   Reviewed Medications with Patient/Family/Caregiver?   Yes     10. Patient taking medications as prescribed?   Yes    11. If not taking medications as prescribed, note specific medicine(s) and reason for each:    Not Applicable    Hospital Follow Up Plan:    12. Follow up Appointment with Orthopedic surgeon:   Patient confirms follow up appointment    13. Home Care ordered at discharge?  Yes         14. Home Care started, or contact made?   Yes  If no, action taken: Not Applicable    15. DME obtained/used in home?    Yes    16. Using IS?   Yes    17. Other information:    Not Applicable

## 2025-05-27 ENCOUNTER — OUTSIDE FACILITY SERVICE (OUTPATIENT)
Dept: FAMILY MEDICINE CLINIC | Facility: CLINIC | Age: 70
End: 2025-05-27
Payer: MEDICARE

## 2025-05-28 ENCOUNTER — TELEPHONE (OUTPATIENT)
Dept: ORTHOPEDIC SURGERY | Facility: CLINIC | Age: 70
End: 2025-05-28
Payer: MEDICARE

## 2025-05-28 DIAGNOSIS — Z96.651 S/P TOTAL KNEE ARTHROPLASTY, RIGHT: Primary | ICD-10-CM

## 2025-05-28 RX ORDER — OXYCODONE AND ACETAMINOPHEN 7.5; 325 MG/1; MG/1
1 TABLET ORAL EVERY 4 HOURS PRN
Qty: 30 TABLET | Refills: 0 | Status: SHIPPED | OUTPATIENT
Start: 2025-05-28 | End: 2025-06-02

## 2025-05-28 NOTE — TELEPHONE ENCOUNTER
I called and spoke with her.  She has been experiencing difficulties with home health PT.  Apparently they are short staffed and she has only had a handful of appointments thus far.  I recommend we try to get her an outpatient PT as soon as possible.  She says that she has arrangements to go to [facility] for outpatient PT.    She was a little concerned about some bruising around her incision.  She sent me a picture.  I told her I think it looks okay.  She will follow-up as scheduled.  I did agree to refill her pain medicine.  Risks were discussed.

## 2025-06-02 ENCOUNTER — TELEPHONE (OUTPATIENT)
Dept: ORTHOPEDIC SURGERY | Facility: CLINIC | Age: 70
End: 2025-06-02
Payer: MEDICARE

## 2025-06-02 NOTE — TELEPHONE ENCOUNTER
I spoke to Ms. Ceja.  She reports she has an itchy rash on her back which she associates with onset of starting Percocet.  She has since continue that medication.  She is taking hydrocodone as needed for her pain and seems to be tolerating this well.  She does have a history of shingles, I recommended she contact her primary provider for possible evaluation and recommendations.  She agreed.  Otherwise, she feels she is doing well in regards to the knee.  I encouraged her to call if she has any other questions or concerns.

## 2025-06-06 ENCOUNTER — OFFICE VISIT (OUTPATIENT)
Dept: ORTHOPEDIC SURGERY | Facility: CLINIC | Age: 70
End: 2025-06-06
Payer: MEDICARE

## 2025-06-06 VITALS — BODY MASS INDEX: 31.32 KG/M2 | TEMPERATURE: 98 F | HEIGHT: 65 IN | WEIGHT: 188 LBS

## 2025-06-06 DIAGNOSIS — R52 PAIN: Primary | ICD-10-CM

## 2025-06-06 DIAGNOSIS — Z09 SURGERY FOLLOW-UP: ICD-10-CM

## 2025-06-06 RX ORDER — ASPIRIN 81 MG/1
81 TABLET ORAL DAILY
COMMUNITY

## 2025-06-06 RX ORDER — HYDROCODONE BITARTRATE AND ACETAMINOPHEN 7.5; 325 MG/1; MG/1
1 TABLET ORAL EVERY 6 HOURS PRN
Qty: 30 TABLET | Refills: 0 | Status: SHIPPED | OUTPATIENT
Start: 2025-06-06

## 2025-06-06 NOTE — PROGRESS NOTES
Sweta Ceja     : 1955     MRN: [id number]     DATE: 2025    DIAGNOSIS: Follow-up 2 weeks status post total knee arthroplasty    SUBJECTIVE:  Patient returns today for 2 week follow up of recent knee replacement. Patient reports doing well with no unusual complaints.  Patient reports compliance with the anticoagulation.    OBJECTIVE:     Exam: The incision is healing appropriately.  No sign of infection.  Range of motion is progressing as expected.  The calf is soft and nontender with a negative Homans sign.    DIAGNOSTIC STUDIES     Xrays: AP and lateral views of the right knee were ordered and reviewed for evaluation of recent knee replacement. They demonstrate a well positioned, well aligned knee replacement without complicating factors noted. In comparison with previous films there has been interval implant placement.    ASSESSMENT: 2 week status post right knee replacement    PLAN:    Continue PT per protocol  Continue to ice as needed.  Continue anticoagulation as discussed.  We discussed the need for prophylactic antibiotics prior to dental appointments for 2 years following replacement surgery.  Follow up in 4 weeks.  6.  I agreed to refill the hydrocodone medicine at her request.  Risks were discussed.         Elbert Belle MD    2025

## 2025-06-11 ENCOUNTER — TELEPHONE (OUTPATIENT)
Dept: ORTHOPEDIC SURGERY | Facility: CLINIC | Age: 70
End: 2025-06-11
Payer: MEDICARE

## 2025-06-13 DIAGNOSIS — R52 PAIN: ICD-10-CM

## 2025-06-13 DIAGNOSIS — Z96.651 S/P TOTAL KNEE ARTHROPLASTY, RIGHT: Primary | ICD-10-CM

## 2025-06-13 RX ORDER — HYDROCODONE BITARTRATE AND ACETAMINOPHEN 7.5; 325 MG/1; MG/1
1 TABLET ORAL EVERY 6 HOURS PRN
Qty: 30 TABLET | Refills: 0 | Status: SHIPPED | OUTPATIENT
Start: 2025-06-13

## 2025-06-13 NOTE — TELEPHONE ENCOUNTER
Spoke with pt; she was informed that Yamila still has her message but that I would let her know she called back to check on it.    Quality 226: Preventive Care And Screening: Tobacco Use: Screening And Cessation Intervention: Patient screened for tobacco use and is an ex/non-smoker Detail Level: Detailed Quality 110: Preventive Care And Screening: Influenza Immunization: Influenza Immunization previously received during influenza season Quality 111:Pneumonia Vaccination Status For Older Adults: Pneumococcal vaccine (PPSV23) administered on or after patient’s 60th birthday and before the end of the measurement period

## 2025-06-13 NOTE — TELEPHONE ENCOUNTER
"Caller: Call, Criselda \"Alesia\"    Relationship to patient: Self    Best call back number: [phone]*    Patient is needing: PT IS CALLING TO CHECK THE STATUS OF THE REFILL.. PT WILL RUN OUT TOMORROW.. PLEASE ADVISE..      "

## 2025-06-16 ENCOUNTER — TELEPHONE (OUTPATIENT)
Dept: ORTHOPEDIC SURGERY | Facility: CLINIC | Age: 70
End: 2025-06-16
Payer: MEDICARE

## 2025-06-16 NOTE — TELEPHONE ENCOUNTER
I spoke to MsJing Brenna.  She says the swelling and redness have both significantly improved.  Reports She has some lateral sided pain with hyperflexion, but this seems to be improving as well. Overall, she feels she is doing well.  I encouraged her to call with any other questions or concerns.

## 2025-06-21 DIAGNOSIS — R52 PAIN: ICD-10-CM

## 2025-06-21 DIAGNOSIS — Z96.651 S/P TOTAL KNEE ARTHROPLASTY, RIGHT: ICD-10-CM

## 2025-06-23 RX ORDER — HYDROCODONE BITARTRATE AND ACETAMINOPHEN 7.5; 325 MG/1; MG/1
1 TABLET ORAL EVERY 6 HOURS PRN
Qty: 30 TABLET | Refills: 0 | Status: SHIPPED | OUTPATIENT
Start: 2025-06-23

## 2025-07-03 ENCOUNTER — OFFICE VISIT (OUTPATIENT)
Age: 70
End: 2025-07-03
Payer: MEDICARE

## 2025-07-03 VITALS — BODY MASS INDEX: 31.32 KG/M2 | TEMPERATURE: 98 F | HEIGHT: 65 IN | WEIGHT: 188 LBS

## 2025-07-03 DIAGNOSIS — Z96.651 S/P TOTAL KNEE ARTHROPLASTY, RIGHT: ICD-10-CM

## 2025-07-03 RX ORDER — HYDROCODONE BITARTRATE AND ACETAMINOPHEN 7.5; 325 MG/1; MG/1
1 TABLET ORAL EVERY 6 HOURS PRN
Qty: 30 TABLET | Refills: 0 | Status: SHIPPED | OUTPATIENT
Start: 2025-07-03

## 2025-07-03 RX ORDER — ACETAMINOPHEN 500 MG
500 TABLET ORAL EVERY 6 HOURS PRN
COMMUNITY

## 2025-07-03 NOTE — PROGRESS NOTES
Sweta Ceja : 1955 MRN: [id number] DATE: 7/3/2025    DIAGNOSIS: 6 week follow up right TKA      SUBJECTIVE:  Patient returns today for 6 week follow up of right total knee replacement. Patient reports she is having some lateral sided knee pain with flexion which started recently.  She noticed onset of this pain as she was using her cycling device.  Otherwise, she is doing well.     Vitals:    25 1318   Temp: 98 °F (36.7 °C)       OBJECTIVE:     Exam:  The incision is well healed. No sign of infection.  Tenderness over region of the lateral collateral ligament.  Range of motion is measured at 5-105°.  No evident instability.  The calf is soft and nontender with a negative Homans sign.  Gait is reciprocal heel-to-toe and only mildly antalgic.    DIAGNOSTIC STUDIES    Xrays: 3 views of the right knee (AP, lateral, and sunrise) were ordered and reviewed for evaluation of recent knee replacement. They demonstrate a well positioned, well aligned knee replacement without complicating factors noted. In comparison with previous films there has been no change.    ASSESSMENT:  6 week status post right knee replacement.    PLAN:   Continue PT per protocol.  I demonstrated some stretching exercises to help progress her range of motion.  I recommend she use OTC Voltaren gel to the lateral aspect of the knee.  She may continue to ice as needed.  May discontinue anticoagulation.    We discussed the need for prophylactic antibiotics prior to dental appointments for 2 years following replacement surgery.  I have given her a refill of Norco today.  The risk were discussed.    Follow up in 6 weeks with Dr. Belle.     Yamila Tate, SHASTA    7/3/2025    Answers submitted by the patient for this visit:  Post Operative Visit (Submitted on 2025)  Chief Complaint: Follow-up  Pain Control: partially controlled  Fever: no fever  Diet: lack of appetite  Activity: moderately limited  Operative Site Issues: No

## 2025-07-13 DIAGNOSIS — Z96.651 S/P TOTAL KNEE ARTHROPLASTY, RIGHT: ICD-10-CM

## 2025-07-14 RX ORDER — HYDROCODONE BITARTRATE AND ACETAMINOPHEN 7.5; 325 MG/1; MG/1
1 TABLET ORAL EVERY 6 HOURS PRN
Qty: 30 TABLET | Refills: 0 | Status: SHIPPED | OUTPATIENT
Start: 2025-07-14

## 2025-07-16 ENCOUNTER — TELEPHONE (OUTPATIENT)
Dept: ORTHOPEDIC SURGERY | Facility: CLINIC | Age: 70
End: 2025-07-16
Payer: MEDICARE

## 2025-07-16 NOTE — TELEPHONE ENCOUNTER
I spoke to Ms. Ceja.  She says the area at the top of her incision actually looks a little better at this time.  She is just very concerned there may be an infection starting.  She denies fever, chills, increased pain in the knee, or any other concerning symptoms.  I have reviewed the photo and it appears to be a stitch abscess.  I instructed her to apply Betadine to that area, allowed to air dry, and cover with a Band-Aid.  She may repeat this twice daily.  I will see her Friday morning for evaluation.

## 2025-07-18 ENCOUNTER — OFFICE VISIT (OUTPATIENT)
Dept: ORTHOPEDIC SURGERY | Facility: CLINIC | Age: 70
End: 2025-07-18
Payer: MEDICARE

## 2025-07-18 VITALS — HEIGHT: 64 IN | WEIGHT: 170.4 LBS | BODY MASS INDEX: 29.09 KG/M2 | TEMPERATURE: 97.8 F

## 2025-07-18 DIAGNOSIS — Z09 SURGERY FOLLOW-UP: Primary | ICD-10-CM

## 2025-07-18 NOTE — PROGRESS NOTES
"CC:  Right knee wound check    Ms. Ceja comes into today for right knee wound check.  She is 2 months status post right knee replacement by Dr. Belle.  Reports she had some drainage from the top of the incision, but this has resolved.  She has been using  Betadine in this area as instructed.  Denies fever, chills, redness, or increased pain in the knee.    Vitals:    07/18/25 0838   Temp: 97.8 °F (36.6 °C)   Weight: 77.3 kg (170 lb 6.4 oz)   Height: 162.6 cm (64\")     Exam: Right knee: Incision is well-approximated.  She has a small scab at the proximal end of the incision.  Otherwise, the incision is well-healed.  Knee motion is progressing as expected.    Assessment: 2-month status post right TKA    Plan: Her incision looks good.  There are no signs of infection.  I instructed her to avoid submerging in water at this time.  She can discontinue the Betadine application.  Encouraged her to continue physical therapy per protocol.  I encouraged her to call if she has any other questions or concerns.  She will follow-up with Dr. Belle in 1 month as previously scheduled.    SHASTA Nunes    07/18/2025    Answers submitted by the patient for this visit:  Post Operative Visit (Submitted on 7/16/2025)  Chief Complaint: Follow-up  Pain Control: partially controlled  Fever: no fever  Diet: adequate intake  Activity: impaired due to weakness  Operative Site Issues: Yes  Drainage: colored  Redness: improved  Swelling: improved  Operative site comments:: Soreness at top of incision        "

## 2025-07-23 DIAGNOSIS — Z96.651 S/P TOTAL KNEE ARTHROPLASTY, RIGHT: ICD-10-CM

## 2025-07-24 RX ORDER — HYDROCODONE BITARTRATE AND ACETAMINOPHEN 7.5; 325 MG/1; MG/1
1 TABLET ORAL EVERY 8 HOURS PRN
Qty: 30 TABLET | Refills: 0 | Status: SHIPPED | OUTPATIENT
Start: 2025-07-24

## 2025-08-10 DIAGNOSIS — Z96.651 S/P TOTAL KNEE ARTHROPLASTY, RIGHT: ICD-10-CM

## 2025-08-11 RX ORDER — HYDROCODONE BITARTRATE AND ACETAMINOPHEN 7.5; 325 MG/1; MG/1
1 TABLET ORAL EVERY 8 HOURS PRN
Qty: 30 TABLET | Refills: 0 | Status: SHIPPED | OUTPATIENT
Start: 2025-08-11

## 2025-08-22 ENCOUNTER — OFFICE VISIT (OUTPATIENT)
Dept: ORTHOPEDIC SURGERY | Facility: CLINIC | Age: 70
End: 2025-08-22
Payer: MEDICARE

## 2025-08-22 VITALS — WEIGHT: 164.2 LBS | HEIGHT: 65 IN | TEMPERATURE: 98.7 F | BODY MASS INDEX: 27.36 KG/M2

## 2025-08-22 DIAGNOSIS — Z09 SURGERY FOLLOW-UP: Primary | ICD-10-CM

## (undated) DEVICE — GLV SURG SENSICARE PI MIC PF SZ7 LF STRL

## (undated) DEVICE — SOL ISO/ALC 70PCT 4OZ

## (undated) DEVICE — SINGLE-USE BIOPSY FORCEPS: Brand: RADIAL JAW 4

## (undated) DEVICE — KT ORCA ORCAPOD DISP STRL

## (undated) DEVICE — LASSO POLYPECTOMY SNARE: Brand: LASSO

## (undated) DEVICE — NEEDLE, QUINCKE, 20GX3.5": Brand: MEDLINE

## (undated) DEVICE — SOL IRR NACL 0.9PCT 3000ML

## (undated) DEVICE — SOL NACL 0.9PCT 1000ML

## (undated) DEVICE — TRAP FLD MINIVAC MEGADYNE 100ML

## (undated) DEVICE — ZIP 24 SURGICAL SKIN CLOSURE DEVICE, PSA: Brand: ZIP 24 SURGICAL SKIN CLOSURE DEVICE

## (undated) DEVICE — INTENDED TO SUPPORT AND MAINTAIN THE POSITION OF AN ANESTHETIZED PATIENT DURING SURGERY: Brand: HERMANTOR XL PINK KNEE POSITIONING PAD

## (undated) DEVICE — GLV SURG SENSICARE PI PF LF 7 GRN STRL

## (undated) DEVICE — DUAL CUT SAGITTAL BLADE

## (undated) DEVICE — PATIENT RETURN ELECTRODE, SINGLE-USE, CONTACT QUALITY MONITORING, ADULT, WITH 9FT CORD, FOR PATIENTS WEIGING OVER 33LBS. (15KG): Brand: MEGADYNE

## (undated) DEVICE — GLV SURG BIOGEL LTX PF 8 1/2

## (undated) DEVICE — BNDG,ELSTC,MATRIX,STRL,6"X5YD,LF,HOOK&LP: Brand: MEDLINE

## (undated) DEVICE — SYS IRR SURGIPHOR A/MIC RTU BO PVPI 450ML STRL

## (undated) DEVICE — PK KN TOTL 40

## (undated) DEVICE — Device

## (undated) DEVICE — GOWN ,SIRUS,NONREINFORCED 3XL: Brand: MEDLINE

## (undated) DEVICE — DECANTER BAG 9": Brand: MEDLINE INDUSTRIES, INC.

## (undated) DEVICE — CANN NASL CO2 TRULINK W/O2 A/

## (undated) DEVICE — GLV SURG SIGNATURE ESSENTIAL PF LTX SZ8.5

## (undated) DEVICE — SKIN PREP TRAY W/CHG: Brand: MEDLINE INDUSTRIES, INC.

## (undated) DEVICE — VIAL FORMLN CAP 10PCT 20ML

## (undated) DEVICE — PREP SOL POVIDONE/IODINE BT 4OZ

## (undated) DEVICE — APPL CHLORAPREP HI/LITE 26ML ORNG

## (undated) DEVICE — FLEX ADVANTAGE 1500CC: Brand: FLEX ADVANTAGE

## (undated) DEVICE — GOWN ISOL W/THUMB UNIV BLU BX/15

## (undated) DEVICE — SUT VIC 2/0 CT2 27IN J269H

## (undated) DEVICE — GLV SURG BIOGEL LTX PF 6 1/2

## (undated) DEVICE — CEMENT MIXING SYSTEM WITH FEMORAL BREAKWAY NOZZLE: Brand: REVOLUTION

## (undated) DEVICE — ANTIBACTERIAL UNDYED BRAIDED (POLYGLACTIN 910), SYNTHETIC ABSORBABLE SUTURE: Brand: COATED VICRYL